# Patient Record
Sex: MALE | Race: WHITE | HISPANIC OR LATINO | URBAN - METROPOLITAN AREA
[De-identification: names, ages, dates, MRNs, and addresses within clinical notes are randomized per-mention and may not be internally consistent; named-entity substitution may affect disease eponyms.]

---
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
## 2020-03-13 ENCOUNTER — APPOINTMENT (OUTPATIENT)
Dept: NEUROLOGY | Facility: CLINIC | Age: 20
End: 2020-03-13
Payer: MEDICAID

## 2020-03-13 VITALS
HEIGHT: 71 IN | WEIGHT: 150 LBS | SYSTOLIC BLOOD PRESSURE: 126 MMHG | DIASTOLIC BLOOD PRESSURE: 80 MMHG | BODY MASS INDEX: 21 KG/M2

## 2020-03-13 DIAGNOSIS — G62.9 POLYNEUROPATHY, UNSPECIFIED: ICD-10-CM

## 2020-03-13 PROCEDURE — 99204 OFFICE O/P NEW MOD 45 MIN: CPT

## 2020-04-07 ENCOUNTER — APPOINTMENT (OUTPATIENT)
Dept: NEUROLOGY | Facility: CLINIC | Age: 20
End: 2020-04-07

## 2020-06-23 ENCOUNTER — EMERGENCY (EMERGENCY)
Facility: HOSPITAL | Age: 20
LOS: 1 days | Discharge: HOME | End: 2020-06-23
Attending: EMERGENCY MEDICINE
Payer: COMMERCIAL

## 2020-06-23 VITALS
WEIGHT: 160.06 LBS | DIASTOLIC BLOOD PRESSURE: 84 MMHG | TEMPERATURE: 99 F | RESPIRATION RATE: 18 BRPM | OXYGEN SATURATION: 98 % | HEART RATE: 88 BPM | SYSTOLIC BLOOD PRESSURE: 124 MMHG

## 2020-06-23 LAB
APPEARANCE UR: CLEAR — SIGNIFICANT CHANGE UP
BILIRUB UR-MCNC: NEGATIVE — SIGNIFICANT CHANGE UP
COLOR SPEC: YELLOW — SIGNIFICANT CHANGE UP
DIFF PNL FLD: NEGATIVE — SIGNIFICANT CHANGE UP
GLUCOSE UR QL: NEGATIVE MG/DL — SIGNIFICANT CHANGE UP
KETONES UR-MCNC: ABNORMAL
LEUKOCYTE ESTERASE UR-ACNC: NEGATIVE — SIGNIFICANT CHANGE UP
NITRITE UR-MCNC: NEGATIVE — SIGNIFICANT CHANGE UP
PH UR: 6 — SIGNIFICANT CHANGE UP (ref 5–8)
PROT UR-MCNC: 30 MG/DL
SP GR SPEC: 1.02 — SIGNIFICANT CHANGE UP (ref 1.01–1.02)
UROBILINOGEN FLD QL: NEGATIVE MG/DL — SIGNIFICANT CHANGE UP

## 2020-06-23 PROCEDURE — 87591 N.GONORRHOEAE DNA AMP PROB: CPT

## 2020-06-23 PROCEDURE — 81001 URINALYSIS AUTO W/SCOPE: CPT

## 2020-06-23 PROCEDURE — 99283 EMERGENCY DEPT VISIT LOW MDM: CPT

## 2020-06-23 PROCEDURE — 99284 EMERGENCY DEPT VISIT MOD MDM: CPT

## 2020-06-23 PROCEDURE — 87086 URINE CULTURE/COLONY COUNT: CPT

## 2020-06-23 PROCEDURE — 87491 CHLMYD TRACH DNA AMP PROBE: CPT

## 2020-06-23 RX ORDER — IBUPROFEN 200 MG
600 TABLET ORAL ONCE
Refills: 0 | Status: COMPLETED | OUTPATIENT
Start: 2020-06-23 | End: 2020-06-23

## 2020-06-23 RX ADMIN — Medication 600 MILLIGRAM(S): at 23:36

## 2020-06-23 NOTE — ED PROVIDER NOTE - OBJECTIVE STATEMENT
pt is a 20 y/o male presenting to the ed with "pain in his penis for the past six months". pt admits to burning sensation when he pees. pt denies ever being sexually active, trauma to the area, or any outside objects being put in the area. pt denies abd pain, nausea, vomiting, cp, sob, fevers, penile discharge, hematuria, back pain. pt with no hx of stds

## 2020-06-23 NOTE — ED ADULT TRIAGE NOTE - CHIEF COMPLAINT QUOTE
Patient is alert and oriented x3 c/o burning with pain inside his penis x6 months. also c/o dysuria. denies hematuria. denies fevers.

## 2020-06-23 NOTE — ED PROVIDER NOTE - PATIENT PORTAL LINK FT
You can access the FollowMyHealth Patient Portal offered by [facility] by registering at the following website: [email or web address]. By joining [employer]’s FollowMyHealth portal, you will also be able to view your health information using other applications (apps) compatible with our system.

## 2020-06-23 NOTE — ED PROVIDER NOTE - PHYSICAL EXAMINATION
Const: Awake, alert and oriented. In no acute distress. Well appearing.  HEENT: NC/AT. Moist mucous membranes.  Eyes: No scleral icterus. EOMI.  Neck:. Soft and supple. Full ROM without pain.  Cardiac: +S1/S2. No murmurs. Peripheral pulses 2+ and symmetric. No LE edema.  Resp: Speaking in full sentences. No evidence of respiratory distress. No wheezes, rales or rhonchi.  Abd: Soft, non-tender, non-distended. Normal bowel sounds in all 4 quadrants. No guarding or rebound.  Back: Spine midline and non-tender. No CVAT.  Genitlia: Uncircumcised, no external lesions noted or discharge, no testicular tenderness on palpation  Skin: No rashes, abrasions or lacerations.  Lymph: No cervical lymphadenopathy.  Neuro: Awake, alert & oriented x 3. Moves all extremities symmetrically.

## 2020-06-23 NOTE — ED PROVIDER NOTE - ATTENDING CONTRIBUTION TO CARE
HPI: 18yo male p/w penile pain x 6 months. Masturbates without lubrication and states he has pain on ejaculation. Denies ever being sexually active. Endorses slower stream of urine. No penile discharge, testicular pain, fevers, chills, or hematuria. Denies FB to urethra.     PE:  Gen: NAD  Head: NCAT  HEENT: Oral mucosa moist, normal conjunctiva  CV: RRR no murmurs, normal perfusion  Resp: CTA b/l, no wheezing, rales, rhonchi, no respiratory distress  GI: Abd Soft NTND  Neuro: No focal neuro deficits  MSK: FROM all 4 extremities, no deformity  Skin: No rash, no bruising  Psych: Normal affect  : Uncircumcised male, normal external genitalia, testicles nontender    MDM: Pt with penile pain x 6 months- check UA, GC/Chlamydia, likely urology f/u. Heather Young DO     I performed a history and physical exam of the patient and discussed their management with the advanced care provider. I reviewed the advanced care provider's note and agree with the documented findings and plan of care. My medical decision making and objective findings are found above.

## 2020-06-23 NOTE — ED PROVIDER NOTE - CARE PROVIDER_API CALL
Adolfo Short  UROLOGY  [address] [place] [id number]  Phone: [phone]  Fax: [phone]  Follow Up Time:

## 2020-06-24 LAB
EPI CELLS # UR: SIGNIFICANT CHANGE UP
RBC CASTS # UR COMP ASSIST: SIGNIFICANT CHANGE UP /HPF (ref 0–4)
WBC UR QL: NEGATIVE — SIGNIFICANT CHANGE UP

## 2020-06-24 NOTE — ED ADULT NURSE NOTE - OBJECTIVE STATEMENT
Patient is a 19 year old male, A&Ox3 in no apparent distress. c/o groin pain. Patient states has been experiencing burning sensation during urination and masturbation. Denies any sexual activity. States top of penis is red. Denies any discharge.

## 2020-06-25 LAB
C TRACH RRNA SPEC QL NAA+PROBE: SIGNIFICANT CHANGE UP
CULTURE RESULTS: NO GROWTH — SIGNIFICANT CHANGE UP
N GONORRHOEA RRNA SPEC QL NAA+PROBE: SIGNIFICANT CHANGE UP
SPECIMEN SOURCE: SIGNIFICANT CHANGE UP
SPECIMEN SOURCE: SIGNIFICANT CHANGE UP

## 2021-02-01 ENCOUNTER — EMERGENCY (EMERGENCY)
Facility: HOSPITAL | Age: 21
LOS: 1 days | Discharge: HOME | End: 2021-02-01
Attending: EMERGENCY MEDICINE
Payer: COMMERCIAL

## 2021-02-01 VITALS
HEIGHT: 71 IN | WEIGHT: 149.91 LBS | OXYGEN SATURATION: 95 % | DIASTOLIC BLOOD PRESSURE: 90 MMHG | RESPIRATION RATE: 17 BRPM | HEART RATE: 89 BPM | TEMPERATURE: 99 F | SYSTOLIC BLOOD PRESSURE: 129 MMHG

## 2021-02-01 PROCEDURE — 99283 EMERGENCY DEPT VISIT LOW MDM: CPT

## 2021-02-01 RX ORDER — IBUPROFEN 200 MG
600 TABLET ORAL ONCE
Refills: 0 | Status: COMPLETED | OUTPATIENT
Start: 2021-02-01 | End: 2021-02-01

## 2021-02-01 RX ADMIN — Medication 600 MILLIGRAM(S): at 21:19

## 2021-02-01 NOTE — ED PROVIDER NOTE - CLINICAL SUMMARY MEDICAL DECISION MAKING FREE TEXT BOX
Pt is a 20y M with no PMH presenting for R lateral neck pain lasting for a few minutes PTA and then resolved. No other complaints. Will medicate and reassess. Pt is a 20y M with no PMH presenting for R lateral neck pain lasting for a few minutes PTA and then resolved. No other complaints. Will medicate and dc with PMD f/u.

## 2021-02-01 NOTE — ED PROVIDER NOTE - PATIENT PORTAL LINK FT
You can access the FollowMyHealth Patient Portal offered by [facility] by registering at the following website: [email or web address]. By joining [employer]’s FollowMyHealth portal, you will also be able to view your health information using other applications (apps) compatible with our system.

## 2021-02-01 NOTE — ED PROVIDER NOTE - OBJECTIVE STATEMENT
Pt is a 20y M with no PMH presenting for R sided neck pain x few minutes PTA. Pt states symptoms have since resolved. He describes the feeling like he was 'punched in the throat'. He did not take anything for pain. Pt denies any fever/chills/sore throat/decreased ROM/cough. NKDA. No other complaints. Pt is a 20y M with no PMH presenting for R sided neck pain x few minutes PTA. Pt states symptoms have since resolved. He describes the feeling like he was 'punched in the throat'. He denies any trauma. He did not take anything for pain. Pt denies any fever/chills/sore throat/decreased ROM/cough. NKDA. No other complaints. No IVDA.

## 2021-02-01 NOTE — ED PROVIDER NOTE - ATTENDING CONTRIBUTION TO CARE
I, Teto Brown, performed a face to face bedside interview with this patient regarding history of present illness, review of symptoms and relevant past medical, social and family history.  I completed an independent physical examination. I have communicated the patient’s plan of care and disposition with the ACP.  20 year old male with no PMH presents with neck pain. States he had a sudden onset pain to his R anterior neck, felt like he got punched, and then it resolved. Denies throat pain, difficulty swallowing, fever, chills, blurry vision, numbness, tingling, weka ness  Gen: NAD, well appearing  ENT" oropharynx clear, no stridor, no carotid bruits, non-tender to palpation  CV: RRR  Pul: CTA b/l  Abd: Soft, non-distended, non-tender  Neuro: no focal deficits  Pt improved, stable for dc

## 2021-02-01 NOTE — ED ADULT TRIAGE NOTE - CHIEF COMPLAINT QUOTE
c/o pain to R side of neck beginning PTA, denies injury or trauma. full ROM noted, denies fevers or sick contacts

## 2021-06-19 ENCOUNTER — EMERGENCY (EMERGENCY)
Facility: HOSPITAL | Age: 21
LOS: 1 days | Discharge: HOME | End: 2021-06-19
Attending: EMERGENCY MEDICINE
Payer: COMMERCIAL

## 2021-06-19 VITALS
RESPIRATION RATE: 18 BRPM | TEMPERATURE: 98 F | SYSTOLIC BLOOD PRESSURE: 114 MMHG | DIASTOLIC BLOOD PRESSURE: 66 MMHG | OXYGEN SATURATION: 99 % | HEART RATE: 64 BPM | HEIGHT: 71 IN

## 2021-06-19 PROCEDURE — 99282 EMERGENCY DEPT VISIT SF MDM: CPT

## 2021-06-19 NOTE — ED STATDOCS - OBJECTIVE STATEMENT
20 M presents to ed c/o wound to R 2nd digit s/p injury earlier today after hitting it into metal. States it bled but stopped with pressure. Denies any other complaints or injuries. Tetanus UTD.

## 2021-06-19 NOTE — ED STATDOCS - ATTENDING CONTRIBUTION TO CARE
Pt. awake and alert. No open wound to 2nd digit. NO active bleeding. I, Dr. Panda, performed a face to face bedside interview with this patient regarding history of present illness, review of symptoms and relevant past medical, social and family history.  I completed an independent physical examination.  I have also reviewed the ACP's note(s) and discussed the plan with the ACP.

## 2021-06-19 NOTE — ED STATDOCS - PATIENT PORTAL LINK FT
You can access the FollowMyHealth Patient Portal offered by [facility] by registering at the following website: [email or web address]. By joining [employer]’s FollowMyHealth portal, you will also be able to view your health information using other applications (apps) compatible with our system.

## 2021-06-21 ENCOUNTER — EMERGENCY (EMERGENCY)
Facility: HOSPITAL | Age: 21
LOS: 1 days | Discharge: HOME | End: 2021-06-21
Payer: COMMERCIAL

## 2021-06-21 VITALS
OXYGEN SATURATION: 97 % | RESPIRATION RATE: 16 BRPM | TEMPERATURE: 98 F | HEIGHT: 71 IN | WEIGHT: 160.06 LBS | HEART RATE: 78 BPM | SYSTOLIC BLOOD PRESSURE: 117 MMHG | DIASTOLIC BLOOD PRESSURE: 66 MMHG

## 2021-06-21 LAB — SARS-COV-2 RNA SPEC QL NAA+PROBE: SIGNIFICANT CHANGE UP

## 2021-06-21 PROCEDURE — U0005: CPT

## 2021-06-21 PROCEDURE — 99283 EMERGENCY DEPT VISIT LOW MDM: CPT

## 2021-06-21 PROCEDURE — U0003: CPT

## 2021-06-21 NOTE — ED STATDOCS - PATIENT PORTAL LINK FT
You can access the FollowMyHealth Patient Portal offered by [facility] by registering at the following website: [email or web address]. By joining [employer]’s FollowMyHealth portal, you will also be able to view your health information using other applications (apps) compatible with our system.

## 2021-06-21 NOTE — ED STATDOCS - NS ED ROS FT
General: no fever or chills  Eyes: no redness or discharge  ENT: no nasal congestion, no sore throat  Cardiac: no CP, no palpitations  Respiratory: No cough, SOB, MCCORD, wheeze  Abd: no abd pain, N/V/D  Skin: no itch or rash

## 2021-06-22 ENCOUNTER — TRANSCRIPTION ENCOUNTER (OUTPATIENT)
Age: 21
End: 2021-06-22

## 2021-10-24 ENCOUNTER — EMERGENCY (EMERGENCY)
Facility: HOSPITAL | Age: 21
LOS: 1 days | Discharge: HOME | End: 2021-10-24
Attending: EMERGENCY MEDICINE
Payer: COMMERCIAL

## 2021-10-24 VITALS
TEMPERATURE: 98 F | DIASTOLIC BLOOD PRESSURE: 79 MMHG | HEIGHT: 71 IN | OXYGEN SATURATION: 98 % | RESPIRATION RATE: 18 BRPM | SYSTOLIC BLOOD PRESSURE: 137 MMHG | WEIGHT: 164.91 LBS | HEART RATE: 95 BPM

## 2021-10-24 PROCEDURE — 99282 EMERGENCY DEPT VISIT SF MDM: CPT

## 2021-10-24 NOTE — ED PROVIDER NOTE - NSFOLLOWUPINSTRUCTIONS_ED_ALL_ED_FT
Nail Bed Injury    The nail bed is the soft tissue under a fingernail or toenail. The nail bed can be injured in different ways. You may:  •Get bruising or bleeding under the nail.      •Get cuts in the nail or nail bed.      •Lose all or part of the nail.      After your nail is hurt or torn off, it can take many months to grow again. The nail may not grow back normally.      What are the causes?  This condition is usually caused by crushing, pinching, cutting, or tearing injuries of the fingertip or toe. These injuries might happen when a finger or toe gets:   •Caught in a door.      •Hit by a hammer.      •Damaged in accidents while you are using power tools or machinery.        What are the signs or symptoms?  Symptoms vary depending on the type of injury. Symptoms may include:  •Pain in the injured area.       •Bleeding.       •Swelling.       •A change in color of the area.      •Collection of blood under the nail (hematoma).    •Damage to the nail, such as:   •A deformed or split nail.      •A loose nail that is not stuck to the nail bed.       •Loss of all or part of the nail.          How is this treated?  Treatment may depend on the type of injury. Some injuries may not need any treatment other than keeping the area clean and free of infection. Treatment may include:  •Draining blood from under the nail. This can be done by making a small hole in the nail.      •Removing all or part of your nail. This might be done in order to stitch (suture) any cut in the nail bed.      •Stitching a torn-off nail back in place.      •Putting bandages (dressings) or splints on the area.    •Taking medicines, such as:  •Antibiotic medicine to help prevent infection.      •Pain medicine.        •Having a tetanus shot. This may be needed if you have not had a tetanus shot in the last 10 years.        Follow these instructions at home:    Managing pain, stiffness, and swelling     •Raise (elevate) the injured area above the level of your heart while you are sitting or lying down.      •Keep your injury protected with bandages or splints as told by your doctor.    •For an injured toenail:  •Try not to walk on your injured leg.      •Wear an open-toed shoe when you walk.      •Try not to let your leg hang down (dangle) when you are sitting or lying down.        Wound care   Follow any wound care instructions given by your doctor. Make sure you:  •Keep the injured area clean.      •Keep any bandages clean and dry.      •Wash your hands with soap and water for at least 20 seconds before and after you change any bandage. If you cannot use soap and water, use hand .      •Change or take off the bandage only as told by your doctor.      •Leave any stitches in place. These may need to stay in place for 2 weeks.    •Check the injured area every day for signs of infection. Check for:  •More redness, swelling, or pain.      •More fluid or blood.      •Warmth.      •Pus or a bad smell.        General instructions    •Take over-the-counter and prescription medicines only as told by your doctor.      •If you were prescribed an antibiotic medicine, use it as told by your doctor. Do not stop using the antibiotic even if you start to feel better.      •Ask your doctor if the medicine prescribed to you requires you to avoid driving or using machinery.      •Keep all follow-up visits as told by your doctor. This is important.        Contact a doctor if:    •Medicine does not help your pain.    •You have any of these problems in the injured area:  •More redness.      •More pain or swelling.      •More fluid or blood coming from the area.      •The area feels warm to the touch.      •Pus or a bad smell coming from the area.        •You have a fever and your symptoms get worse.        Get help right away if:    •You lose feeling (have numbness) in your finger or toe.      •Your finger or toe turns blue.        Summary    •The nail bed is the soft tissue under a fingernail or toenail.      •Sometimes, after a nail or nail bed is injured, the nail may not grow back normally.      •Raise (elevate) the injured area above the level of your heart while you are sitting or lying down.      •Keep any bandages clean and dry. Change or take them off only as told by your doctor.      •Take over-the-counter and prescription medicines only as told by your doctor.      This information is not intended to replace advice given to you by your health care provider. Make sure you discuss any questions you have with your health care provider.

## 2021-10-24 NOTE — ED PROVIDER NOTE - NS ED ROS FT
ROS: CONTUSIONAL: Denies fever, chills, fatigue, wt loss. HEAD: Denies trauma, HA, Dizziness. EYE: Denies Acute visual changes, diplopia. ENMT: Denies change in hearing, tinnitus, epistaxis, difficulty swallowing, sore throat. CARDIO: Denies CP, palpitations, edema. RESP: Denies Cough, SOB , Diff breathing, hemoptysis. GI: Denies N/V, ABD pain, change in bowel movement. URINARY: Denies difficulty urinating, pelvic pain. MS:  Denies joint pain, back pain, weakness, decreased ROM, swelling. NEURO: Denies change in gait, seizures, loss of sensation, dizziness, confusion LOC.  PSY: NO SI/HI. SKIN: +toe nail pain, Denies Rash, bruising.

## 2021-10-24 NOTE — ED PROVIDER NOTE - PATIENT PORTAL LINK FT
You can access the FollowMyHealth Patient Portal offered by [facility] by registering at the following website: [email or web address]. By joining [employer]’s FollowMyHealth portal, you will also be able to view your health information using other applications (apps) compatible with our system.

## 2021-10-24 NOTE — ED PROVIDER NOTE - SKIN LOCATION #1
Lt great toe JESSICA, sternght intact, no change in skin, good cap refill, horizontal partial fracture of nail

## 2021-10-24 NOTE — ED PROVIDER NOTE - CARE PROVIDER_API CALL
Ba Maria (DPM)  Podiatric Medicine and Surgery  [address] [place] [address]  Phone: [phone]  Fax: [phone]  Follow Up Time:

## 2021-10-24 NOTE — ED PROVIDER NOTE - CLINICAL SUMMARY MEDICAL DECISION MAKING FREE TEXT BOX
PT with stable VS, no acute distress, non toxic appearing, tolerating PO in the ED, Pt neuro vasc intact, no S/S of local or systemic inflection, Pt with partial chronic fracture of nail with no acute change or trama, Pt to be dc home with supportive care, follow up to podiatry, educated about when to return to the ED if needed. PT verbalizes that he understands all instructions and results. Pt informed that ED is open and available 24/7 365 days a yr, encouraged to return to the ED if they have any change in condition, or feel the need for revaluation.

## 2021-10-24 NOTE — ED PROVIDER NOTE - ADDITIONAL NOTES AND INSTRUCTIONS:
PT was evaluated At [facility] ED and was found to have a condition that warranted time of to rest and heal from WORK/SCHOOL.   Chava Zaldivar PA-C

## 2021-10-24 NOTE — ED PROVIDER NOTE - ATTENDING CONTRIBUTION TO CARE
Kelvin: I performed a face to face bedside interview with patient regarding history of present illness, review of symptoms and past medical history. I completed an independent physical exam.  I have discussed patient's plan of care with advanced care provider.   I agree with note as stated above including HISTORY OF PRESENT ILLNESS, HIV, PAST MEDICAL/SURGICAL/FAMILY/SOCIAL HISTORY, ALLERGIES AND HOME MEDICATIONS, REVIEW OF SYSTEMS, PHYSICAL EXAM, MEDICAL DECISION MAKING and any PROGRESS NOTES during the time I functioned as the attending physician for this patient  unless otherwise noted. My brief assessment is as follows: Patient with big toe pain, no signs of infection, appears that old toenail is fractured and will fall off, nailbed intact, new toenail growing in. Will give podiatry follow up. Return precautions given.

## 2021-10-24 NOTE — ED PROVIDER NOTE - OBJECTIVE STATEMENT
PT with no SPMHx presents to the ED with complaint of Lt great toe pain x 1 wk. Pt states that he had a gradual onset of pain that is non radiating, feels dull in nature, constat, made worse with activity. Pt states that he was seen a few months ago by podiatry and had a infection drained from that toe had resolution of symptoms then this new complaint started. Pt dines fever, chills, weakness, numbness, tingling, drainage, change in skin, redness, swelling, diff moving toe, trama.

## 2022-01-04 ENCOUNTER — APPOINTMENT (OUTPATIENT)
Dept: NEUROLOGY | Facility: CLINIC | Age: 22
End: 2022-01-04
Payer: MEDICAID

## 2022-01-04 DIAGNOSIS — Z86.69 PERSONAL HISTORY OF OTHER DISEASES OF THE NERVOUS SYSTEM AND SENSE ORGANS: ICD-10-CM

## 2022-01-04 DIAGNOSIS — G43.909 MIGRAINE, UNSPECIFIED, NOT INTRACTABLE, W/OUT STATUS MIGRAINOSUS: ICD-10-CM

## 2022-01-04 PROCEDURE — 99214 OFFICE O/P EST MOD 30 MIN: CPT | Mod: 95

## 2022-01-04 NOTE — HISTORY OF PRESENT ILLNESS
[FreeTextEntry1] : Mr. Reynoso is a 21 year-old man with PMH migraines who presents via Telehealth for evaluation of tinnitus x 6 months. He describes tinnitus as high pitched, constant and occurs in both ears. He has been evaluated by ENT and told he has no hearing loss. He reports that onset of symptoms may have occurred around the same time as exposure to 99% isopropyl alcohol he was using for work. He denies exposure to loud noises or ear infection. He reports chronic migraine headaches. He denies loss of vision, double vision, changes in personality or cognition, difficulty speaking or finding the correct words, unilateral weakness or impaired sensation, problems with coordination, difficulty walking or falls.

## 2022-01-04 NOTE — REVIEW OF SYSTEMS
[Feeling Tired] : feeling tired [Migraine Headache] : migraine headaches [Fever] : no fever [Chills] : no chills [Anxiety] : no anxiety [Depression] : no depression [Confused or Disoriented] : no confusion [Memory Lapses or Loss] : no memory loss [Decr. Concentrating Ability] : no decrease in concentrating ability [Difficulty with Language] : no ~M difficulty with language [Facial Weakness] : no facial weakness [Arm Weakness] : no arm weakness [Hand Weakness] : no hand weakness [Leg Weakness] : no leg weakness [Poor Coordination] : good coordination [Numbness] : no numbness [Tingling] : no tingling [Abnormal Sensation] : no abnormal sensation [Seizures] : no convulsions [Dizziness] : no dizziness [Difficulty Walking] : no difficulty walking [Inability to Walk] : able to walk [Ataxia] : no ataxia [Eye Pain] : no eye pain [Eyesight Problems] : no eyesight problems [Earache] : no earache [Loss Of Hearing] : no hearing loss [Chest Pain] : no chest pain [Palpitations] : no palpitations [Shortness Of Breath] : no shortness of breath [Cough] : no cough [SOB on Exertion] : no shortness of breath during exertion [Constipation] : no constipation [Diarrhea] : no diarrhea [Dysuria] : no dysuria [Incontinence] : no incontinence [Joint Pain] : no joint pain [Joint Swelling] : no joint swelling [Joint Stiffness] : no joint stiffness [FreeTextEntry4] : Tinnitus

## 2022-01-04 NOTE — DISCUSSION/SUMMARY
[FreeTextEntry1] : Mr. Reynoso is a 21 year-old man with PMH migraines who presents via Telehealth for evaluation of tinnitus x 6 months. Perform MRI and MRA head to rule-out intracranial or vascular cause of tinnitus and labs to rule-out metabolic cause of symptoms. If neurological work-up is negative, will refer to ENT for further evaluation. Follow-up with me in 6-8 weeks. All of his questions or concerns were addressed.

## 2022-01-04 NOTE — PHYSICAL EXAM
[FreeTextEntry1] : GENERAL PHYSICAL EXAM:\par GEN: no distress, normal affect\par HEENT: NCAT\par CV/PULM/ABD: deferred due to telehealth encounter \par \par NEUROLOGICAL EXAM:\par Alert and oriented to person, place, time, and situation \par Clear and fluent language, intact comprehension and repetition\par Grossly looks in all directs\par No obvious facial asymmetry \par Hearing intact \par BL shoulder shrug intact \par Moves all EXTs spontaneously\par Ambulates independently

## 2022-01-16 ENCOUNTER — EMERGENCY (EMERGENCY)
Facility: HOSPITAL | Age: 22
LOS: 1 days | Discharge: HOME | End: 2022-01-16
Attending: EMERGENCY MEDICINE
Payer: COMMERCIAL

## 2022-01-16 VITALS
DIASTOLIC BLOOD PRESSURE: 83 MMHG | OXYGEN SATURATION: 99 % | HEIGHT: 71 IN | RESPIRATION RATE: 20 BRPM | WEIGHT: 169.98 LBS | HEART RATE: 141 BPM | TEMPERATURE: 99 F | SYSTOLIC BLOOD PRESSURE: 124 MMHG

## 2022-01-16 VITALS — HEART RATE: 106 BPM

## 2022-01-16 LAB
ANION GAP SERPL CALC-SCNC: 15 MMOL/L — SIGNIFICANT CHANGE UP (ref 5–17)
APTT BLD: 39.9 SEC — HIGH (ref 27.5–35.5)
BASOPHILS # BLD AUTO: 0.02 K/UL — SIGNIFICANT CHANGE UP (ref 0–0.2)
BASOPHILS NFR BLD AUTO: 0.2 % — SIGNIFICANT CHANGE UP (ref 0–2)
BUN SERPL-MCNC: 8 MG/DL — SIGNIFICANT CHANGE UP (ref 8–20)
CALCIUM SERPL-MCNC: 9.1 MG/DL — SIGNIFICANT CHANGE UP (ref 8.6–10.2)
CHLORIDE SERPL-SCNC: 100 MMOL/L — SIGNIFICANT CHANGE UP (ref 98–107)
CO2 SERPL-SCNC: 23 MMOL/L — SIGNIFICANT CHANGE UP (ref 22–29)
CREAT SERPL-MCNC: 0.76 MG/DL — SIGNIFICANT CHANGE UP (ref 0.5–1.3)
D DIMER BLD IA.RAPID-MCNC: <150 NG/ML DDU — SIGNIFICANT CHANGE UP
EOSINOPHIL # BLD AUTO: 0.05 K/UL — SIGNIFICANT CHANGE UP (ref 0–0.5)
EOSINOPHIL NFR BLD AUTO: 0.5 % — SIGNIFICANT CHANGE UP (ref 0–6)
GLUCOSE SERPL-MCNC: 124 MG/DL — HIGH (ref 70–99)
HCT VFR BLD CALC: 40.2 % — SIGNIFICANT CHANGE UP (ref 39–50)
HGB BLD-MCNC: 14.4 G/DL — SIGNIFICANT CHANGE UP (ref 13–17)
IMM GRANULOCYTES NFR BLD AUTO: 0.2 % — SIGNIFICANT CHANGE UP (ref 0–1.5)
INR BLD: 1.34 RATIO — HIGH (ref 0.88–1.16)
LYMPHOCYTES # BLD AUTO: 2.88 K/UL — SIGNIFICANT CHANGE UP (ref 1–3.3)
LYMPHOCYTES # BLD AUTO: 27.5 % — SIGNIFICANT CHANGE UP (ref 13–44)
MCHC RBC-ENTMCNC: 30.1 PG — SIGNIFICANT CHANGE UP (ref 27–34)
MCHC RBC-ENTMCNC: 35.8 GM/DL — SIGNIFICANT CHANGE UP (ref 32–36)
MCV RBC AUTO: 84.1 FL — SIGNIFICANT CHANGE UP (ref 80–100)
MONOCYTES # BLD AUTO: 1.04 K/UL — HIGH (ref 0–0.9)
MONOCYTES NFR BLD AUTO: 9.9 % — SIGNIFICANT CHANGE UP (ref 2–14)
NEUTROPHILS # BLD AUTO: 6.46 K/UL — SIGNIFICANT CHANGE UP (ref 1.8–7.4)
NEUTROPHILS NFR BLD AUTO: 61.7 % — SIGNIFICANT CHANGE UP (ref 43–77)
PLATELET # BLD AUTO: 348 K/UL — SIGNIFICANT CHANGE UP (ref 150–400)
POTASSIUM SERPL-MCNC: 2.9 MMOL/L — CRITICAL LOW (ref 3.5–5.3)
POTASSIUM SERPL-SCNC: 2.9 MMOL/L — CRITICAL LOW (ref 3.5–5.3)
PROTHROM AB SERPL-ACNC: 15.3 SEC — HIGH (ref 10.6–13.6)
RBC # BLD: 4.78 M/UL — SIGNIFICANT CHANGE UP (ref 4.2–5.8)
RBC # FLD: 12 % — SIGNIFICANT CHANGE UP (ref 10.3–14.5)
SODIUM SERPL-SCNC: 138 MMOL/L — SIGNIFICANT CHANGE UP (ref 135–145)
TSH SERPL-MCNC: 2.24 UIU/ML — SIGNIFICANT CHANGE UP (ref 0.27–4.2)
WBC # BLD: 10.47 K/UL — SIGNIFICANT CHANGE UP (ref 3.8–10.5)
WBC # FLD AUTO: 10.47 K/UL — SIGNIFICANT CHANGE UP (ref 3.8–10.5)

## 2022-01-16 PROCEDURE — 85025 COMPLETE CBC W/AUTO DIFF WBC: CPT

## 2022-01-16 PROCEDURE — 85610 PROTHROMBIN TIME: CPT

## 2022-01-16 PROCEDURE — 71045 X-RAY EXAM CHEST 1 VIEW: CPT

## 2022-01-16 PROCEDURE — 99285 EMERGENCY DEPT VISIT HI MDM: CPT

## 2022-01-16 PROCEDURE — 93005 ELECTROCARDIOGRAM TRACING: CPT

## 2022-01-16 PROCEDURE — 36415 COLL VENOUS BLD VENIPUNCTURE: CPT

## 2022-01-16 PROCEDURE — 85379 FIBRIN DEGRADATION QUANT: CPT

## 2022-01-16 PROCEDURE — 99284 EMERGENCY DEPT VISIT MOD MDM: CPT | Mod: 25

## 2022-01-16 PROCEDURE — 80048 BASIC METABOLIC PNL TOTAL CA: CPT

## 2022-01-16 PROCEDURE — 85730 THROMBOPLASTIN TIME PARTIAL: CPT

## 2022-01-16 PROCEDURE — 93010 ELECTROCARDIOGRAM REPORT: CPT

## 2022-01-16 PROCEDURE — 96374 THER/PROPH/DIAG INJ IV PUSH: CPT

## 2022-01-16 PROCEDURE — 84443 ASSAY THYROID STIM HORMONE: CPT

## 2022-01-16 PROCEDURE — 71045 X-RAY EXAM CHEST 1 VIEW: CPT | Mod: 26

## 2022-01-16 RX ORDER — POTASSIUM CHLORIDE 20 MEQ
40 PACKET (EA) ORAL ONCE
Refills: 0 | Status: COMPLETED | OUTPATIENT
Start: 2022-01-16 | End: 2022-01-16

## 2022-01-16 RX ORDER — SODIUM CHLORIDE 9 MG/ML
1000 INJECTION INTRAMUSCULAR; INTRAVENOUS; SUBCUTANEOUS ONCE
Refills: 0 | Status: COMPLETED | OUTPATIENT
Start: 2022-01-16 | End: 2022-01-16

## 2022-01-16 RX ORDER — KETOROLAC TROMETHAMINE 30 MG/ML
15 SYRINGE (ML) INJECTION ONCE
Refills: 0 | Status: DISCONTINUED | OUTPATIENT
Start: 2022-01-16 | End: 2022-01-16

## 2022-01-16 RX ADMIN — Medication 40 MILLIEQUIVALENT(S): at 03:02

## 2022-01-16 RX ADMIN — SODIUM CHLORIDE 1000 MILLILITER(S): 9 INJECTION INTRAMUSCULAR; INTRAVENOUS; SUBCUTANEOUS at 02:31

## 2022-01-16 RX ADMIN — Medication 15 MILLIGRAM(S): at 02:31

## 2022-01-16 NOTE — ED PROVIDER NOTE - ATTENDING CONTRIBUTION TO CARE
Kelvin: I performed a face to face bedside interview with patient regarding history of present illness, review of symptoms and past medical history. I completed an independent physical exam and ordered tests/medications as needed.  I have discussed patient's plan of care with the resident. The resident assisted in  executing the discussed plan. I was available for any questions or issues that may have arose during the execution of the plan of care.

## 2022-01-16 NOTE — ED PROVIDER NOTE - PATIENT PORTAL LINK FT
You can access the FollowMyHealth Patient Portal offered by  by registering at the following website: [email or web address]. By joining [employer]’s FollowMyHealth portal, you will also be able to view your health information using other applications (apps) compatible with our system.

## 2022-01-16 NOTE — ED PROVIDER NOTE - OBJECTIVE STATEMENT
22 y/o male denies PMHx c/o palpitations. Pt states his heart rate was up to 155 BPM a few hours ago, per pt pulse ox which he has due to recent covid infection 2 weeks ago, pt states he check . Pt states he starting have fast reading earlier tonight. Pt states he had arm tingling before he checked his heart rate. Pt states he also has back pain, worse when lying on his back, better when laying on his side.  Pt denies previous hx of anxiety and depression. Pt denies known drug allergies. Pt denies chest pain, leg pain, leg swelling recent travel, SOB. 20 y/o male denies PMHx c/o palpitations. Pt states his heart rate was up to 155 BPM a few hours ago, per pt pulse ox which he has due to recent covid infection 2 weeks ago, pt states he checked his pulse ox at home and then began feeling palpitations when he noticed the number was high. Pt states he starting have fast reading earlier tonight. Pt states he had arm tingling before he checked his heart rate. Pt states he also has back pain, worse when lying on his back or with stretching, better when laying on his side.  Pt denies previous hx of anxiety and depression. Pt denies known drug allergies. Pt denies chest pain, leg pain, leg swelling, fever, recent travel, SOB.

## 2022-01-16 NOTE — ED PROVIDER NOTE - PHYSICAL EXAMINATION
Gen: Well appearing in NAD  Head: NC/AT  Neck: trachea midline  Card: tachycardic, regular rhythm   Resp:  CTAB  Abd: soft, non-tender  Ext: no calf tenderness, no lower extremity edema   Neuro:  A&O appears non focal  Skin:  Warm and dry as visualized  Psych:  Normal affect and mood

## 2022-01-16 NOTE — ED PROVIDER NOTE - CLINICAL SUMMARY MEDICAL DECISION MAKING FREE TEXT BOX
22 yo male recent covid infection presents w palpitations. Will check labs, D-Dimer, IVF. Monitor and reassess.

## 2022-01-16 NOTE — ED ADULT NURSE NOTE - CHIEF COMPLAINT
The patient is a 21y Male complaining of palpitations- recently tested positive two weeks ago. On assessment, pt is a/ox4- appears comfortable. HR goes up to 155 when pt starts to cry or become anxious. After deep breathing pt HR goes down. Bloods in progress, placed on monitor. Pending dispo.

## 2022-01-21 ENCOUNTER — EMERGENCY (EMERGENCY)
Facility: HOSPITAL | Age: 22
LOS: 1 days | Discharge: HOME | End: 2022-01-21
Attending: EMERGENCY MEDICINE
Payer: COMMERCIAL

## 2022-01-21 VITALS
SYSTOLIC BLOOD PRESSURE: 122 MMHG | OXYGEN SATURATION: 100 % | DIASTOLIC BLOOD PRESSURE: 65 MMHG | HEART RATE: 110 BPM | RESPIRATION RATE: 20 BRPM | TEMPERATURE: 98 F | HEIGHT: 71 IN

## 2022-01-21 VITALS — RESPIRATION RATE: 18 BRPM | HEART RATE: 77 BPM | OXYGEN SATURATION: 100 %

## 2022-01-21 LAB
ANION GAP SERPL CALC-SCNC: 13 MMOL/L — SIGNIFICANT CHANGE UP (ref 5–17)
BUN SERPL-MCNC: 8.4 MG/DL — SIGNIFICANT CHANGE UP (ref 8–20)
CALCIUM SERPL-MCNC: 9.6 MG/DL — SIGNIFICANT CHANGE UP (ref 8.6–10.2)
CHLORIDE SERPL-SCNC: 101 MMOL/L — SIGNIFICANT CHANGE UP (ref 98–107)
CO2 SERPL-SCNC: 26 MMOL/L — SIGNIFICANT CHANGE UP (ref 22–29)
CREAT SERPL-MCNC: 0.66 MG/DL — SIGNIFICANT CHANGE UP (ref 0.5–1.3)
GLUCOSE SERPL-MCNC: 96 MG/DL — SIGNIFICANT CHANGE UP (ref 70–99)
HCT VFR BLD CALC: 43 % — SIGNIFICANT CHANGE UP (ref 39–50)
HGB BLD-MCNC: 15.2 G/DL — SIGNIFICANT CHANGE UP (ref 13–17)
MAGNESIUM SERPL-MCNC: 2 MG/DL — SIGNIFICANT CHANGE UP (ref 1.6–2.6)
MCHC RBC-ENTMCNC: 29.6 PG — SIGNIFICANT CHANGE UP (ref 27–34)
MCHC RBC-ENTMCNC: 35.3 GM/DL — SIGNIFICANT CHANGE UP (ref 32–36)
MCV RBC AUTO: 83.8 FL — SIGNIFICANT CHANGE UP (ref 80–100)
PLATELET # BLD AUTO: 321 K/UL — SIGNIFICANT CHANGE UP (ref 150–400)
POTASSIUM SERPL-MCNC: 4.1 MMOL/L — SIGNIFICANT CHANGE UP (ref 3.5–5.3)
POTASSIUM SERPL-SCNC: 4.1 MMOL/L — SIGNIFICANT CHANGE UP (ref 3.5–5.3)
RBC # BLD: 5.13 M/UL — SIGNIFICANT CHANGE UP (ref 4.2–5.8)
RBC # FLD: 12.2 % — SIGNIFICANT CHANGE UP (ref 10.3–14.5)
SODIUM SERPL-SCNC: 140 MMOL/L — SIGNIFICANT CHANGE UP (ref 135–145)
WBC # BLD: 13.92 K/UL — HIGH (ref 3.8–10.5)
WBC # FLD AUTO: 13.92 K/UL — HIGH (ref 3.8–10.5)

## 2022-01-21 PROCEDURE — 93005 ELECTROCARDIOGRAM TRACING: CPT

## 2022-01-21 PROCEDURE — 83735 ASSAY OF MAGNESIUM: CPT

## 2022-01-21 PROCEDURE — 80048 BASIC METABOLIC PNL TOTAL CA: CPT

## 2022-01-21 PROCEDURE — 99284 EMERGENCY DEPT VISIT MOD MDM: CPT

## 2022-01-21 PROCEDURE — 93010 ELECTROCARDIOGRAM REPORT: CPT

## 2022-01-21 PROCEDURE — 99283 EMERGENCY DEPT VISIT LOW MDM: CPT

## 2022-01-21 PROCEDURE — 85027 COMPLETE CBC AUTOMATED: CPT

## 2022-01-21 PROCEDURE — 36415 COLL VENOUS BLD VENIPUNCTURE: CPT

## 2022-01-21 RX ORDER — SODIUM CHLORIDE 9 MG/ML
1000 INJECTION INTRAMUSCULAR; INTRAVENOUS; SUBCUTANEOUS ONCE
Refills: 0 | Status: COMPLETED | OUTPATIENT
Start: 2022-01-21 | End: 2022-01-21

## 2022-01-21 RX ADMIN — SODIUM CHLORIDE 1000 MILLILITER(S): 9 INJECTION INTRAMUSCULAR; INTRAVENOUS; SUBCUTANEOUS at 04:34

## 2022-01-21 NOTE — ED PROVIDER NOTE - PATIENT PORTAL LINK FT
Alert and oriented to person, place and time, memory intact, behavior appropriate to situation, PERRL.
You can access the FollowMyHealth Patient Portal offered by [facility] by registering at the following website: [email or web address]. By joining [facility]’s FollowMyHealth portal, you will also be able to view your health information using other applications (apps) compatible with our system.

## 2022-01-21 NOTE — ED PROVIDER NOTE - NSFOLLOWUPINSTRUCTIONS_ED_ALL_ED_FT
- Follow up with your doctor within 2-3 days.   - Follow with your cardiologist.   - Bring results with you to the appointment.   - Return to the ED for any new or worsening symptoms.     Palpitations    A palpitation is the feeling that your heartbeat is irregular or is faster than normal. It may feel like your heart is fluttering or skipping a beat. They may be caused by many things, including smoking, caffeine, alcohol, stress, and certain medicines. Although most causes of palpitations are not serious, palpitations can be a sign of a serious medical problem. Avoid caffeine, alcohol, and tobacco products at home. Try to reduce stress and anxiety and make sure to get plenty of rest.     SEEK IMMEDIATE MEDICAL CARE IF YOU HAVE ANY OF THE FOLLOWING SYMPTOMS: chest pain, shortness of breath, severe headache, dizziness/lightheadedness, or fainting.

## 2022-01-21 NOTE — ED PROVIDER NOTE - OBJECTIVE STATEMENT
21M no PMH p/w palpitations tonight, worse when he stands up. Has been seen for same 5 days ago, followed up with cardiology, had a holter and is waiting for results. Has been checking his HR multiple times per day on home pulse ox. Denies SOB, fever, vomiting, anxiety.

## 2022-01-21 NOTE — ED ADULT TRIAGE NOTE - CHIEF COMPLAINT QUOTE
Pt. BIBA for palpitations. Pt. states he was here the other day and was told he had low potassium. anxious in triage. Pt. denies other hx.

## 2022-01-21 NOTE — ED PROVIDER NOTE - CLINICAL SUMMARY MEDICAL DECISION MAKING FREE TEXT BOX
Patient with recurrent palpitations, VSS, benign exam. Following with cardiology. Was hypokalemic on last visit. Plan for ecg, labs, reassess.

## 2022-01-21 NOTE — ED ADULT NURSE NOTE - OBJECTIVE STATEMENT
Assumed care of the Pt AOx3 in no acute distress. pt states he feeling palpitations tried taking some omi seltzer but it did not work Pt was seen in ED recently for similar complaints Pt appears anxious current HR is 87 meds to be given as per orders, labs to be sent as per orders pt educated on plan of care, pt able to successfully teach back plan of care to RN, RN will continue to reeducate pt during hospital stay.

## 2022-01-21 NOTE — ED PROVIDER NOTE - PHYSICAL EXAMINATION
Gen: Well appearing in NAD  Head: NC/AT  Neck: trachea midline  Resp:  No distress, CTAB  CV: RRR  GI: soft, NTND  Ext: no deformities, no calf ttp  Neuro:  A&O appears non focal  Skin:  Warm and dry as visualized  Psych:  Normal affect and mood

## 2022-01-23 ENCOUNTER — APPOINTMENT (OUTPATIENT)
Dept: MRI IMAGING | Facility: CLINIC | Age: 22
End: 2022-01-23
Payer: MEDICAID

## 2022-01-23 ENCOUNTER — OUTPATIENT (OUTPATIENT)
Dept: OUTPATIENT SERVICES | Facility: HOSPITAL | Age: 22
LOS: 1 days | End: 2022-01-23
Payer: MEDICAID

## 2022-01-23 DIAGNOSIS — G43.909 MIGRAINE, UNSPECIFIED, NOT INTRACTABLE, WITHOUT STATUS MIGRAINOSUS: ICD-10-CM

## 2022-01-23 DIAGNOSIS — H93.19 TINNITUS, UNSPECIFIED EAR: ICD-10-CM

## 2022-01-23 PROCEDURE — 70553 MRI BRAIN STEM W/O & W/DYE: CPT | Mod: 26

## 2022-01-23 PROCEDURE — 70544 MR ANGIOGRAPHY HEAD W/O DYE: CPT | Mod: 26,59

## 2022-01-23 PROCEDURE — A9585: CPT

## 2022-01-23 PROCEDURE — 70544 MR ANGIOGRAPHY HEAD W/O DYE: CPT

## 2022-01-23 PROCEDURE — 70553 MRI BRAIN STEM W/O & W/DYE: CPT

## 2022-02-28 ENCOUNTER — EMERGENCY (EMERGENCY)
Facility: HOSPITAL | Age: 22
LOS: 1 days | Discharge: HOME | End: 2022-02-28
Attending: EMERGENCY MEDICINE
Payer: COMMERCIAL

## 2022-02-28 VITALS
RESPIRATION RATE: 20 BRPM | SYSTOLIC BLOOD PRESSURE: 138 MMHG | DIASTOLIC BLOOD PRESSURE: 91 MMHG | OXYGEN SATURATION: 98 % | TEMPERATURE: 99 F | HEART RATE: 126 BPM | WEIGHT: 169.98 LBS | HEIGHT: 71 IN

## 2022-02-28 PROCEDURE — 99284 EMERGENCY DEPT VISIT MOD MDM: CPT

## 2022-02-28 PROCEDURE — 93010 ELECTROCARDIOGRAM REPORT: CPT

## 2022-02-28 PROCEDURE — 93005 ELECTROCARDIOGRAM TRACING: CPT

## 2022-02-28 PROCEDURE — 99283 EMERGENCY DEPT VISIT LOW MDM: CPT

## 2022-02-28 NOTE — ED PROVIDER NOTE - PATIENT PORTAL LINK FT
You can access the FollowMyHealth Patient Portal offered by [facility] by registering at the following website: [email or web address]. By joining [email or web address]’s FollowMyHealth portal, you will also be able to view your health information using other applications (apps) compatible with our system.

## 2022-02-28 NOTE — ED PROVIDER NOTE - OBJECTIVE STATEMENT
20 y/o male with PMHx of tachycardia presents to the ED c/o chest pain and palpitations. Pt states he was unable to sleep due to the palpitations and did not take anything for his symptoms PTA. Pt follows with cardiology outpatient and has been evaluated for similar complaints in the past.     denies fever. denies HA or neck pain. no sob. no abd pain. no n/v/d. no urinary f/u/d. no back pain. no motor or sensory deficits. denies illicit drug use. no recent travel. no rash. no other acute issues symptoms or concerns

## 2022-02-28 NOTE — ED ADULT TRIAGE NOTE - CHIEF COMPLAINT QUOTE
patient states that he has chest pain radiating to his back with palpitations sweaty fingers not sleeping

## 2022-03-10 ENCOUNTER — APPOINTMENT (OUTPATIENT)
Dept: NEUROLOGY | Facility: CLINIC | Age: 22
End: 2022-03-10
Payer: MEDICAID

## 2022-03-10 VITALS
SYSTOLIC BLOOD PRESSURE: 142 MMHG | OXYGEN SATURATION: 98 % | WEIGHT: 170 LBS | HEART RATE: 115 BPM | HEIGHT: 71 IN | BODY MASS INDEX: 23.8 KG/M2 | DIASTOLIC BLOOD PRESSURE: 88 MMHG | TEMPERATURE: 98 F

## 2022-03-10 PROCEDURE — 99214 OFFICE O/P EST MOD 30 MIN: CPT

## 2022-03-11 NOTE — PHYSICAL EXAM
[FreeTextEntry1] : GENERAL PHYSICAL EXAM:\par GEN: no distress, normal affect\par HEENT: NCAT, OP clear\par EYES: sclera white, conjunctiva clear, no nystagmus\par NECK: supple\par CV: normal rhythm\par PULM: no respiratory distress, normal rhythm and effort\par EXT: no edema, no cyanosis\par MSK: muscle tone and strength normal\par SKIN: warm, dry, no rash or lesion on exposed skin \par \par NEUROLOGICAL EXAM:\par Mental Status\par Orientation: alert and oriented to person, place, time, and situation, 3/3 recall after 5 minutes\par Language: clear and fluent, intact comprehension and repetition, intact naming and reading\par \par Cranial Nerves\par II: visual fields full to confrontation \par III, IV, VI: PERRL, EOMI\par V, VII: facial sensation and movement intact and symmetric \par VIII: hearing intact \par IX, X: uvula midline, soft palate elevates normally \par XI: BL shoulder shrug intact \par XII: tongue midline\par \par Motor\par Shoulder abd: 5 (R), 5 (L)\par EF/EE: 5 (R), 5 (L)\par WF/WE: 5 (R), 5 (L)\par hand : 5 (R), 5 (L)\par HF/HE: 5 (R), 5 (L)\par KF/KE: 5 (R), 5 (L)\par DF/PF: 5 (R), 5 (L) \par Tone and bulk are normal in upper and lower limbs\par No pronator drift\par \par Sensation\par Intact to light touch and vibration in all 4 EXTs\par \par Reflex\par 2+ in BL biceps, brachioradialis, patella\par \par Coordination\par Normal FTN bilaterally\par Dysdiadochokinesia not present. \par Able to perform rapid, alternating movements\par \par Gait\par Normal stance, stride, and pivot turn\par Tandem walk intact\par Negative Romberg

## 2022-03-11 NOTE — HISTORY OF PRESENT ILLNESS
[FreeTextEntry1] : INTERIM HISTORY: Since his last visit, Mr. Reynoso had MRI and MRA performed, which were unremarkable. He tested positive for COVID shortly after our last appointment. He was evaluated by cardiology for elevated HR. Stress test and TTE were normal. He has been very anxious and has noted clammy hands and burning/tingling in bilateral hands and feet. \par \par INITIAL OFFICE VISIT 1/4/22: Mr. Reynoso is a 21 year-old man with PMH migraines who presents via Telehealth for evaluation of tinnitus x 6 months. He describes tinnitus as high pitched, constant and occurs in both ears. He has been evaluated by ENT and told he has no hearing loss. He reports that onset of symptoms may have occurred around the same time as exposure to 99% isopropyl alcohol he was using for work. He denies exposure to loud noises or ear infection. He reports chronic migraine headaches. He denies loss of vision, double vision, changes in personality or cognition, difficulty speaking or finding the correct words, unilateral weakness or impaired sensation, problems with coordination, difficulty walking or falls.

## 2022-03-11 NOTE — DISCUSSION/SUMMARY
[FreeTextEntry1] : Mr. Reynoso is a 21 year-old man with PMH migraines who presents for follow-up of tinnitus x 6 months. MRI and MRA head were unremarkable. Perform EMG to assess paresthesias in the hands and feet. Follow-up with me in 3 months or sooner should the need arise. All of his questions or concerns were addressed.

## 2022-04-04 ENCOUNTER — EMERGENCY (EMERGENCY)
Facility: HOSPITAL | Age: 22
LOS: 1 days | Discharge: HOME | End: 2022-04-04
Attending: EMERGENCY MEDICINE
Payer: COMMERCIAL

## 2022-04-04 VITALS
HEIGHT: 71 IN | OXYGEN SATURATION: 98 % | SYSTOLIC BLOOD PRESSURE: 133 MMHG | WEIGHT: 175.05 LBS | TEMPERATURE: 98 F | RESPIRATION RATE: 18 BRPM | DIASTOLIC BLOOD PRESSURE: 81 MMHG | HEART RATE: 87 BPM

## 2022-04-04 PROCEDURE — 99283 EMERGENCY DEPT VISIT LOW MDM: CPT

## 2022-04-04 RX ORDER — METHOCARBAMOL 500 MG/1
750 TABLET, FILM COATED ORAL ONCE
Refills: 0 | Status: COMPLETED | OUTPATIENT
Start: 2022-04-04 | End: 2022-04-04

## 2022-04-04 RX ORDER — IBUPROFEN 200 MG
600 TABLET ORAL ONCE
Refills: 0 | Status: COMPLETED | OUTPATIENT
Start: 2022-04-04 | End: 2022-04-04

## 2022-04-04 RX ADMIN — Medication 600 MILLIGRAM(S): at 23:37

## 2022-04-04 RX ADMIN — METHOCARBAMOL 750 MILLIGRAM(S): 500 TABLET, FILM COATED ORAL at 23:37

## 2022-04-04 NOTE — ED PROVIDER NOTE - PHYSICAL EXAMINATION
Gen: Well appearing in NAD  Head: NC/AT,   Neck: trachea midline, + minimal right paracervical tenderness, no midline spinal tenderness  Resp:  No distress  Ext: no deformities, distal pulses intact  Neuro:  CN 2-12 intact, No tremors. No fasciculations. No nystagmus. Normal finger to nose and heel to shin b/l. No dysmetria.  Normal gait. Normal sensation. Normal strength.   Back: no midline spinal tenderness  Skin:  Warm and dry as visualized  Psych:  Normal affect and mood Gen: Well appearing in NAD  Head: NC/AT,   Neck: trachea midline, + minimal right paracervical tenderness, no midline spinal tenderness  Resp:  No distress  Ext: no deformities, distal pulses intact, normal cap refill.   Neuro:  CN 2-12 intact, No tremors. No fasciculations. No nystagmus. No dysmetria.  Normal gait. Normal sensation. Normal strength. Normal radial pulses bilaterally.   Back: no midline spinal tenderness  Skin:  Warm and dry as visualized  Psych:  Normal affect and mood

## 2022-04-04 NOTE — ED PROVIDER NOTE - NSFOLLOWUPINSTRUCTIONS_ED_ALL_ED_FT
- Follow up with your doctor within 2-3 days.   - [facility] with [facility], see information above.   - Take Tylenol (Acetaminophen) 650mg or Motrin (Ibuprofen/Advil) 600mg every 6 hours as needed for pain.   - Return to the ED for any new or worsening symptoms.

## 2022-04-04 NOTE — ED PROVIDER NOTE - CLINICAL SUMMARY MEDICAL DECISION MAKING FREE TEXT BOX
pt with neck pain no red flags, normal exam, plan for pain control and outpatient follow-up pt with neck pain no red flags, normal exam, Normal strength/sensation, FROM. plan for pain control and outpatient follow-up

## 2022-04-04 NOTE — ED PROVIDER NOTE - OBJECTIVE STATEMENT
22 y/o male c/o back pain and  tingling to bilateral hands for the past week. Pt has not seen his doctor for this issues.  No fevers, chills, recent spinal procedures, bowel/bladder incontinence, IVDU, cancer, > 51 yo, recent weight loss, trauma, weakness, dysuria, hematuria 22 y/o male c/o back pain and tingling to bilateral hands for the past week. Pt has seen his doctor but did not tell him about the issues.   No fevers, chills, recent spinal procedures, bowel/bladder incontinence, IVDU, cancer, > 49 yo, recent weight loss, trauma, weakness, dysuria, hematuria.

## 2022-04-04 NOTE — ED PROVIDER NOTE - NSFOLLOWUPCLINICS_GEN_ALL_ED_FT
[facility] - [facility]  Neurosurgery/Spine  [address] [place] [id number]  Phone: [phone]  Fax:

## 2022-04-04 NOTE — ED ADULT TRIAGE NOTE - CHIEF COMPLAINT QUOTE
pt c/o back pain , + numbness and tingling on fingers, denies any injury , denies dizziness, denies N/V

## 2022-04-04 NOTE — ED ADULT TRIAGE NOTE - ACCOMPANIED BY
Patient's wet clothes put in a belongings bag, dry socks supplied and warm blanket offered - patient declined blankets, but requested water. Will check with MD about supplying water at this time. Police officers at bedside   Parent

## 2022-04-04 NOTE — ED PROVIDER NOTE - PATIENT PORTAL LINK FT
You can access the FollowMyHealth Patient Portal offered by [facility] by registering at the following website: [email or web address]. By joining [facility]’s FollowMyHealth portal, you will also be able to view your health information using other applications (apps) compatible with our system.

## 2022-04-22 ENCOUNTER — APPOINTMENT (OUTPATIENT)
Dept: NEUROLOGY | Facility: CLINIC | Age: 22
End: 2022-04-22
Payer: MEDICAID

## 2022-04-22 PROCEDURE — 95910 NRV CNDJ TEST 7-8 STUDIES: CPT

## 2022-04-22 PROCEDURE — 95886 MUSC TEST DONE W/N TEST COMP: CPT

## 2022-04-22 NOTE — PROCEDURE
[FreeTextEntry3] : EMG/NCS was performed. Please see scanned EMG results for further details.\par Pt advised to follow up with referring provider for further management.

## 2022-05-09 ENCOUNTER — APPOINTMENT (OUTPATIENT)
Dept: NEUROLOGY | Facility: CLINIC | Age: 22
End: 2022-05-09

## 2022-05-31 ENCOUNTER — APPOINTMENT (OUTPATIENT)
Dept: NEUROLOGY | Facility: CLINIC | Age: 22
End: 2022-05-31
Payer: MEDICAID

## 2022-05-31 VITALS
HEART RATE: 82 BPM | WEIGHT: 175 LBS | DIASTOLIC BLOOD PRESSURE: 76 MMHG | OXYGEN SATURATION: 98 % | HEIGHT: 71 IN | BODY MASS INDEX: 24.5 KG/M2 | SYSTOLIC BLOOD PRESSURE: 144 MMHG | TEMPERATURE: 97.9 F

## 2022-05-31 DIAGNOSIS — M54.2 CERVICALGIA: ICD-10-CM

## 2022-05-31 DIAGNOSIS — R20.2 PARESTHESIA OF SKIN: ICD-10-CM

## 2022-05-31 PROCEDURE — 99213 OFFICE O/P EST LOW 20 MIN: CPT

## 2022-05-31 NOTE — HISTORY OF PRESENT ILLNESS
[FreeTextEntry1] : INTERIM HISTORY: Since his last visit, Mr. Reynoso had an EMG performed, which was normal in the LUE and LLE. The burning and tingling sensation in his hands has resolved. He reports neck pain. He denies radicular pain pain the arm or hand weakness. \par \par INITIAL OFFICE VISIT 1/4/22: Mr. Reynoso is a 21 year-old man with PMH migraines who presents via Telehealth for evaluation of tinnitus x 6 months. He describes tinnitus as high pitched, constant and occurs in both ears. He has been evaluated by ENT and told he has no hearing loss. He reports that onset of symptoms may have occurred around the same time as exposure to 99% isopropyl alcohol he was using for work. He denies exposure to loud noises or ear infection. He reports chronic migraine headaches. He denies loss of vision, double vision, changes in personality or cognition, difficulty speaking or finding the correct words, unilateral weakness or impaired sensation, problems with coordination, difficulty walking or falls. \par \par 3/10/22: Since his last visit, Mr. Reynoso had MRI and MRA performed, which were unremarkable. He tested positive for COVID shortly after our last appointment. He was evaluated by cardiology for elevated HR. Stress test and TTE were normal. He has been very anxious and has noted clammy hands and burning/tingling in bilateral hands and feet.

## 2022-05-31 NOTE — REVIEW OF SYSTEMS
[Feeling Tired] : feeling tired [Migraine Headache] : migraine headaches [Fever] : no fever [Chills] : no chills [Anxiety] : no anxiety [Depression] : no depression [Confused or Disoriented] : no confusion [Memory Lapses or Loss] : no memory loss [Decr. Concentrating Ability] : no decrease in concentrating ability [Difficulty with Language] : no ~M difficulty with language [Facial Weakness] : no facial weakness [Arm Weakness] : no arm weakness [Hand Weakness] : no hand weakness [Leg Weakness] : no leg weakness [Poor Coordination] : good coordination [Numbness] : no numbness [Tingling] : no tingling [Abnormal Sensation] : no abnormal sensation [Seizures] : no convulsions [Dizziness] : no dizziness [Difficulty Walking] : no difficulty walking [Inability to Walk] : able to walk [Ataxia] : no ataxia [Eye Pain] : no eye pain [Eyesight Problems] : no eyesight problems [Earache] : no earache [Loss Of Hearing] : no hearing loss [Chest Pain] : no chest pain [Palpitations] : no palpitations [Shortness Of Breath] : no shortness of breath [Cough] : no cough [SOB on Exertion] : no shortness of breath during exertion [Constipation] : no constipation [Diarrhea] : no diarrhea [Dysuria] : no dysuria [Incontinence] : no incontinence [Joint Pain] : no joint pain [Joint Swelling] : no joint swelling [Joint Stiffness] : no joint stiffness [FreeTextEntry4] : Tinnitus  [FreeTextEntry9] : Neck pain, tightness

## 2022-05-31 NOTE — DISCUSSION/SUMMARY
Rehab Medicine         Patient: Shayla Rogers Date: 2020   : 1936 Attending: Matt Ma MD   84 year old female                    Subjective:  Patient does not appear in distress, patient able to answer my questions or speech for history has much improved.  Patient able to ambulate short distance with the staff.    Patient has agreed to continue inpatient rehab with the goal of returning home.  Patient lives with daughter, however daughter works for school system and patient not be able to remain home independently for at least 8 hours.    Patient has been able to agree for transfer to [facility], patient notes that she will be charged by ambulance company for this transport service    And a negative COVID-19 test is expected prior to transfer,  patient asymptomatic, last negative test on     Reviewed: Allergies, Medical History, Surgical History, Social History, Family History and Medications      Vital Last Value 24 Hour Range   Temperature 97.9 °F (36.6 °C) (20 075) Temp  Min: 97.2 °F (36.2 °C)  Max: 97.9 °F (36.6 °C)   Pulse 71 (20 075) Pulse  Min: 71  Max: 75   Respiratory 16 (20) Resp  Min: 16  Max: 18   Non-Invasive  Blood Pressure 138/76 (20) BP  Min: 138/76  Max: 182/74   Pulse Oximetry 99 % (20) SpO2  Min: 99 %  Max: 100 %     Vital Today Admit   Weight 85.7 kg (20 0500) Weight: 83.5 kg (20)   Height N/A Height: 4' 11\" (149.9 cm) (20)   BMI N/A BMI (Calculated): 37.16 (20)     Weight over the past 48 Hours:  Patient Vitals for the past 48 hrs:   Weight   20 0500 85 kg   20 0500 85.7 kg        Intake/Output:  Last Stool Occurrence:    I/O this shift:  In: -   Out: 300 [Urine:300]  I/O last 3 completed shifts:  In: 50 [P.O.:50]  Out: 1450 [Urine:1450]    Intake/Output Summary (Last 24 hours) at 2020 0901  Last data filed at 2020 0818  Gross per 24  [FreeTextEntry1] : Mr. Reynoso is a 21 year-old man with PMH migraines who presents for follow-up. MRI and MRA head were unremarkable. EMG was normal. Referral provided for physical therapy to address neck pain and posterior headaches.  Follow-up with me in 3 months or sooner should the need arise. All of his questions or concerns were addressed.  hour   Intake 50 ml   Output 1750 ml   Net -1700 ml       Hartmann: No    PHYSICAL EXAM:  Constiutional:  Speech fluent much improved, remains alert oriented, tolerates oral diet, remains continent with bowel and bladder management  Neck: trachea midline, supple.  Cardiovascular:  Irregular  Respiratory:  Maintains quite respirations, cough effort remains coordinated  Gastrointestinal: +BS, non-distended, soft, non-tender.  Genitourinary: no suprapubic or costovertebral angle tenderness.  Musculoskeletal:  Functional muscle strength, mildly reduced left side  Neurologic: Patient maintains eye contact well, vision and hearing functional, speech mildly dysarthric, however able to count from 1-5 and 5-1 fluently.  Patient demonstrates functional strength for both upper lower extremities, gait is somewhat slow, able to walk with a walker, no cornell loss of balance.  Muscle strength functional for both lower extremities  Psychiatric: affect and mood appropriate. The patient is alert, interactive, appropriate.    Laboratory Results:  Lab Results   Component Value Date    SODIUM 140 12/28/2020    POTASSIUM 4.0 12/28/2020    CHLORIDE 105 12/28/2020    CO2 27 12/28/2020    CALCIUM 8.7 12/28/2020    BUN 28 (H) 12/28/2020    CREATININE 1.04 (H) 12/28/2020    INR 1.1 12/26/2020    PT 11.2 12/26/2020    WBC 13.5 (H) 12/28/2020    HCT 26.5 (L) 12/28/2020    HGB 8.9 (L) 12/28/2020     12/28/2020    TSH 4.574 12/14/2020    ALBUMIN 3.3 (L) 12/26/2020    GFRA >60 03/05/2014    GFRNA 57 (L) 03/05/2014    GLUCOSE 115 (H) 12/28/2020           Current Functional status over the last 24 hours:    Nursing Skin Documentation:   Integumentary Assessment: Exceptions to WDL (12/29/20 0689)   Bladder FIM Documentation:                         Bowel FIM Documentation:                        Pain Documentation:       Mobility Documentation:   ,     ,  ,   Supine to sit: with verbal cues, minimal assist and with tactile cues  Training  completed:    Tasks: sit to supine and supine to sit    Education details: patient safety and patient demonstrates understanding    Cues for hand placement and sequencing.   Transfers:    Assistive devices: 2-wheeled walker and gait belt    Sit to stand: with verbal cues, minimal assist and with tactile cues    Stand to sit: with verbal cues, minimal assist and with tactile cues    Toilet: minimal assist and with verbal cues  Training completed:    Tasks: sit to stand and stand to sit    Education details: patient safety and patient requires additional training    Cues for hand placement, to improve eccentric control and safety for line management.   Gait/Ambulation:      Assistance: with verbal cues, with tactile cues and minimal assist   Assistive device: 2-wheeled walker and gait belt    Distance (ft): 100    Pattern: step to    Type: decreased anton  Training Completed:    Tasks: gait training on level surfaces    Education details: patient safety and patient requires additional training    Wheelchair follow due to reduced activity tolerance and patient complaining of R LE pain. Slight veering to R during ambulation and improved with cueing.          ,  ,     ,     Selfcare Documentation:          Activities of Daily Living (ADLs):  Lower Body Dressing:     Footwear assistance: maximal assist    Footwear position: edge of bed    Assist needed for: don/doff right sock, don/doff left sock, steadying, increased time to complete and verbal cueing     Vision:     Eye range of motion: Left: within normal limits (however slightly confused with direction following, only able to follow 1 step commands) Right: within normal limits    Head position: normal    Comments / Details: Completed random [place] cross out with with increase time. Pt jumping back and forth from right side to left. Able to cross all circles out.   Simple trail: able to complete however connecting number 4-3.                  Communication/Cognition/Swallowing Documentation:   ,   mildly increased confusion, more slow, pocketing with meal tray. Patient's bottom denture poorly situated, pocketing decreases with denture adhesive and moderate verbal cues. SLP assess with general consistency, requires cues for attention to task and for clearing pocketed material. Patient able to complete lingual sweeping to clear material given cues and time. Recommend Level 3/easy chew consistency, adhesed dentures, thin liquids.   Requires SLP Follow Up: Yes      ,     ,              Scheduled Meds:  • aspirin  81 mg Oral Daily   • polyethylene glycol  17 g Oral BID   • docusate sodium-sennosides  1 tablet Oral Nightly   • sodium chloride (PF)  2 mL Intracatheter 2 times per day   • heparin (porcine)  5,000 Units Subcutaneous 3 times per day   • atorvastatin  80 mg Oral Nightly   • Potassium Standard Replacement Protocol   Does not apply See Admin Instructions   • Magnesium Standard Replacement Protocol   Does not apply See Admin Instructions     Patient Vitals for the past 48 hrs:   BP Pulse   12/28/20 1617 (!) 181/72 64   12/28/20 2340 (!) 144/71 62   12/29/20 0858 (!) 143/62 70   12/29/20 1525 (!) 182/74 75   12/29/20 1529 (!) 140/60 --   12/29/20 2357 (!) 144/63 72   12/30/20 0757 138/76 71       Patient Active Problem List   Diagnosis   • Osteoarthrosis, unspecified whether generalized or localized, lower leg   • BMI 40.0-44.9, adult (CMS/HCC)   • Chronic renal failure in pediatric patient, stage 3 (moderate) (CMS/HCC)   • Iron deficiency anemia   • Multiple myeloma not having achieved remission (CMS/HCC)   • Facial droop   • Stroke (CMS/HCC)   • Impaired mobility and ADLs       Radiology:    Results for orders placed during the hospital encounter of 12/26/20   CT Head Level 1    Impression IMPRESSION:      No acute intracranial findings.    Probable sequela of chronic microvascular ischemic changes.      Findings were called immediately to the  patient's physician assistant,  BRIANNA LUI on 12/26/2020 6:07 PM.     MRI Brain   Results for orders placed in visit on 01/07/14   MRI BRAIN    Impression IMPRESSION:      1. Nonspecific periventricular and subcortical white matter changes  bilaterally which in light of the patient's age likely reflects chronic  small vessel ischemic changes. No evidence for acute ischemia.  2. No abnormal enhancements. No evidence for CP angle mass.       US Carotid Duplex Bilateral    Results for orders placed during the hospital encounter of 12/26/20   US CAROTID BILATERAL    Impression IMPRESSION:  1. This study is essentially normal. There is no sonographic evidence of  carotid artery stenosis bilaterally.     2. Vertebral artery flow is antegrade bilaterally.             US Chest AP or PA   Results for orders placed during the hospital encounter of 12/26/20   XR Chest AP or PA    Impression IMPRESSION:     No acute findings.      Results for orders placed in visit on 04/06/15   XR CHEST PA AND LATERAL    Impression IMPRESSION:  There is minor atelectasis at the left lung base. Please correlate  clinically to exclude the unlikelihood of an early infiltrate.                              Quality Indicators       DVT/VTE Prophylaxis:  VTE Pharmacologic Prophylaxis: Yes  VTE Mechanical Prophylaxis: Yes  MRI brain on December 27:  Acute/subacute infarction involving the left lentiform nucleus, internal  capsule and caudate nucleus.   Carotid ultrasound study on December 27:essentially normal. There is no sonographic evidence of  carotid artery stenosis bilaterally.      2. Vertebral artery flow is antegrade bilaterally.    Rehab Diagnosis:  Difficulty in mobility and self cares, resolving confusion and speech difficulty, acute stroke.  Multiple myeloma, medication adjustment in progress.  Anemia, chronic kidney disease.     Patient remains under care of multiple medical specialties, per hematology recommendations, patient will  require initially monotherapy with dexamethasone .  Neurology MD recommend patient to continue with aspirin and Lipitor, patient most likely will require 30 day cardiac event monitor.  Formal physical and occupational therapy exams completed, patient demonstrates good activity tolerance and participation, patient on my exam demonstrate mild functional deficits and will require rehab.     Patient has ambulatory and ADL dysfunction due to above mentioned diagnoses.  The patient's medical condition is such that the patient can actively participate in, appropriately benefit from, and tolerate an intensive rehabilitation program. Close medical supervision by a physiatrist is required to ensure that the patient's status continues to allow maximized progress and outcome from a medical and functional standpoint. Due to the complexity of the medical and rehabilitation needs, this patient will need an intensive interdisciplinary and coordinated rehabilitation program.The patient will benefit from an intensive therapy program consisting of nursing, PT, OT and speech therapy to allow for neuromuscular re-education, strengthening, range of motion, bed mobility training, functional transfer training, gait training, balance training, stairs training, ADL retraining, cognitive therapy, swallow therapy, safety education, endurance training, patient/family training, compensatory technique education, and equipment evaluation/education/training.        Estimated Length of Stay:  10-12 days     Expected intensity, frequency, and duration of services:              Physical therapy:  1 hours per day, 5-7 days per week for duration of IRP             Occupational therapy:  1 hours per day, 5-7 days per week for duration       of IRP             Speech therapy:  1 hours per day, 5-7 days per week for duration of IRP               Prognosis:     Medical: Fair                         Functional: Good      Individualized plan of care will be  developed at the first interdisciplinary team meeting.      Goals:  For patient to return to community with improved medical stabiIity and functional performance. with improved medical stability and functional performance. Patient to return at prior living status, at a  modified independent level with ambulation and ADL's, this can be achieved by OT (Occupational Therapy), PT (Physical Therapy), ST (Speech Therapy), RT (Recreational Therapy) and psychology. Post discharge therapy intervention via home health or OP will be determined during IRP interdisciplinary team meetings.     Anticipated post-discharge treatment: Follow-up therapy in home or in out-patient setting. Plan will be determined during the IRP interdisciplinary team meetings prior to discharge.              Chun Benoit MD  12/30/2020  9:01 AM

## 2023-04-25 NOTE — ED PROVIDER NOTE - DISCHARGE DATE
suicidal ideation with plan and means
suicidal behavior/unable to care for self
suicidal ideation with plan and means
suicidal ideation with plan and means
suicidal behavior
suicidal ideation with plan and means
24-Jun-2020
suicidal ideation with plan and means
suicidal behavior/unable to care for self
suicidal ideation with plan and means

## 2023-09-04 ENCOUNTER — NON-APPOINTMENT (OUTPATIENT)
Age: 23
End: 2023-09-04

## 2023-10-09 ENCOUNTER — OFFICE (OUTPATIENT)
Dept: URBAN - METROPOLITAN AREA CLINIC 115 | Facility: CLINIC | Age: 23
Setting detail: OPHTHALMOLOGY
End: 2023-10-09
Payer: COMMERCIAL

## 2023-10-09 DIAGNOSIS — H47.233: ICD-10-CM

## 2023-10-09 DIAGNOSIS — H04.123: ICD-10-CM

## 2023-10-09 DIAGNOSIS — H52.13: ICD-10-CM

## 2023-10-09 DIAGNOSIS — H43.393: ICD-10-CM

## 2023-10-09 PROCEDURE — 92133 CPTRZD OPH DX IMG PST SGM ON: CPT | Performed by: OPHTHALMOLOGY

## 2023-10-09 PROCEDURE — 92014 COMPRE OPH EXAM EST PT 1/>: CPT | Performed by: OPHTHALMOLOGY

## 2023-10-09 PROCEDURE — 92015 DETERMINE REFRACTIVE STATE: CPT | Performed by: OPHTHALMOLOGY

## 2023-10-09 ASSESSMENT — REFRACTION_AUTOREFRACTION
OD_AXIS: 002
OS_SPHERE: -4.25
OD_SPHERE: -4.75
OS_AXIS: 004
OD_CYLINDER: -1.75
OS_CYLINDER: -1.75

## 2023-10-09 ASSESSMENT — REFRACTION_CURRENTRX
OD_VPRISM_DIRECTION: SV
OD_OVR_VA: 20/
OS_OVR_VA: 20/
OD_CYLINDER: -0.50
OS_VPRISM_DIRECTION: SV
OD_AXIS: 099
OS_AXIS: 102
OD_SPHERE: -4.75
OD_AXIS: 179
OD_SPHERE: -5.00
OS_OVR_VA: 20/
OS_SPHERE: -4.50
OS_SPHERE: -4.50
OS_CYLINDER: -0.25
OD_OVR_VA: 20/
OS_VPRISM_DIRECTION: SV
OS_CYLINDER: -1.00
OD_CYLINDER: -0.25
OS_AXIS: 001
OD_VPRISM_DIRECTION: SV

## 2023-10-09 ASSESSMENT — TEAR BREAK UP TIME (TBUT)
OS_TBUT: 1+
OD_TBUT: 1+

## 2023-10-09 ASSESSMENT — SPHEQUIV_DERIVED
OD_SPHEQUIV: -5.625
OS_SPHEQUIV: -5.125
OS_SPHEQUIV: -5.125
OD_SPHEQUIV: -5.625

## 2023-10-09 ASSESSMENT — KERATOMETRY
OS_AXISANGLE_DEGREES: 089
METHOD_AUTO_MANUAL: AUTO
OS_K2POWER_DIOPTERS: 45.75
OS_K1POWER_DIOPTERS: 43.75
OD_AXISANGLE_DEGREES: 094
OD_K2POWER_DIOPTERS: 45.25
OD_K1POWER_DIOPTERS: 43.75

## 2023-10-09 ASSESSMENT — PACHYMETRY
OS_CT_UM: 580
OD_CT_CORRECTION: -4
OD_CT_UM: 600
OS_CT_CORRECTION: -3

## 2023-10-09 ASSESSMENT — TONOMETRY
OS_IOP_MMHG: 18
OD_IOP_MMHG: 20

## 2023-10-09 ASSESSMENT — REFRACTION_MANIFEST
OS_AXIS: 180
OS_CYLINDER: -1.25
OD_VA1: 20/20
OS_VA1: 20/20
OD_CYLINDER: -1.25
OD_SPHERE: -5.00
OS_SPHERE: -4.50
OD_AXIS: 180

## 2023-10-09 ASSESSMENT — VISUAL ACUITY
OS_BCVA: 20/20-2
OD_BCVA: 20/25-1

## 2023-10-09 ASSESSMENT — AXIALLENGTH_DERIVED
OS_AL: 25.2367
OD_AL: 25.569
OD_AL: 25.569
OS_AL: 25.2367

## 2023-11-09 ENCOUNTER — OFFICE (OUTPATIENT)
Dept: URBAN - METROPOLITAN AREA CLINIC 115 | Facility: CLINIC | Age: 23
Setting detail: OPHTHALMOLOGY
End: 2023-11-09
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
## 2023-11-09 PROCEDURE — NO SHOW FE NO SHOW FEE: Performed by: OPHTHALMOLOGY

## 2023-11-23 ENCOUNTER — EMERGENCY (EMERGENCY)
Facility: HOSPITAL | Age: 23
LOS: 1 days | Discharge: HOME | End: 2023-11-23
Attending: EMERGENCY MEDICINE
Payer: MEDICAID

## 2023-11-23 VITALS
TEMPERATURE: 98 F | HEIGHT: 71 IN | HEART RATE: 91 BPM | RESPIRATION RATE: 20 BRPM | OXYGEN SATURATION: 98 % | DIASTOLIC BLOOD PRESSURE: 69 MMHG | SYSTOLIC BLOOD PRESSURE: 127 MMHG | WEIGHT: 209.66 LBS

## 2023-11-23 PROCEDURE — 99283 EMERGENCY DEPT VISIT LOW MDM: CPT

## 2023-11-23 RX ORDER — OXYCODONE AND ACETAMINOPHEN 5; 325 MG/1; MG/1
1 TABLET ORAL
Qty: 6 | Refills: 0
Start: 2023-11-23 | End: 2023-11-24

## 2023-11-23 NOTE — ED PROVIDER NOTE - PHYSICAL EXAMINATION
Gen: No acute distress, non toxic  HEENT: Mucous membranes moist, pink conjunctivae, EOMI. PERRL. Airway patent, uvula midline. +rt and left top wisdom tooth growing in, no palpable or visualized abscesses. no evidence of infectious processes.   CV: RRR, nl s1/s2.  Resp: CTAB, normal rate and effort  GI: Abdomen soft, NT, ND. No rebound, no guarding  : No CVAT b/l  Neuro: A&O x 3, moving all 4 extremities  MSK: No spine or joint tenderness to palpation  Skin: No rashes. intact and perfused.

## 2023-11-23 NOTE — ED PROVIDER NOTE - ATTENDING APP SHARED VISIT CONTRIBUTION OF CARE
22 yo male with no pmhx presents with dental pain x2 mo. Pt states his wisdom teeth have been growing in on the top and was told he needs to get his wisdom teeth taken out however hasn't been able to get an early appt. States he has an appt with oral surgeon set up for Jan 12th. States he has been taking tylenol for the pain but denies taking anything today. States he can't take ibuprofen so often bc has chronic reflux. States he has been able to eat/drink nl. Denies f/c/n/v/cp/sob/palpitations/ cough/rash/headache/dizziness/abd.pain/d/c/dysuria/hematuria  went to [facility] ed but was not able to see a dentist at that time    Head: atraumatic, normacephalic  Face: atraumatic, no crepitus no orbital/maxillary/mandibular ttp  throat: uvula midline no exudates no gum swelling  eyes: perrla eomi  heart: rrr s1s2  lungs: ctab  abd: soft, nt nd +bs no rebound/guarding no cva ttp      --impacted molars will give pain meds - referal coordinator for sooner appt with dental no facial swelling

## 2023-11-23 NOTE — ED PROVIDER NOTE - OBJECTIVE STATEMENT
22 yo male with no pmhx presents with dental pain x2 mo. Pt states his wisdom teeth have been growing in on the top and was told he needs to get his wisdom teeth taken out however hasn't been able to get an early appt. States he has an appt with oral surgeon set up for Jan 12th. States he has been taking tylenol for the pain but denies taking anything today. States he can't take ibuprofen so often bc has chronic reflux. States he has been able to eat/drink nl. Denies fever, chills, body aches, dizziness, LOC, difficulties swallowing, cp, palpitations, sob, abd pain, n/v/c/d, paresthesias in the extremities.

## 2023-11-23 NOTE — ED PROVIDER NOTE - NS ED ATTENDING STATEMENT MOD
This was a shared visit with the SEGUNDO. I reviewed and verified the documentation and independently performed the documented:

## 2023-11-23 NOTE — ED PROVIDER NOTE - NSFOLLOWUPCLINICS_GEN_ALL_ED_FT
[address] [place] [address]  Phone: [phone]  Fax:     [facility]  Dental  [address] [place] [address]  Phone: [phone]  Fax:     [facility]  [place] [address]  Phone: [phone]  Fax:

## 2023-11-23 NOTE — ED PROVIDER NOTE - NSFOLLOWUPINSTRUCTIONS_ED_ALL_ED_FT
- Please follow-up with your primary care doctor in the next 1-2. days.  Please call tomorrow for an appointment.  If you cannot follow-up with your primary care doctor please return to the ED for any urgent issues.  - You were given a copy of the tests performed today.  Please bring the results with you and review them with your primary care doctor.  - If you have any worsening of symptoms or any other concerns please return to the ED immediately.  - Please continue taking your home medications as directed.   - Follow up with the dentist    Dental Pain    Dental pain (toothache) may be caused by many things including tooth decay (cavities or caries), abscess or infection, or trauma. If you were prescribed an antibiotic medicine, finish all of it even if you start to feel better. Rinsing your mouth with salt water or applying ice to the painful area of your face may help with the pain. Follow up with a dentist is important in ensuring good oral health and preventing the worsening of dental disease.    SEEK IMMEDIATE MEDICAL CARE IF YOU HAVE ANY OF THE FOLLOWING SYMPTOMS: unable to open your mouth, trouble breathing or swallowing, fever, or swelling of the face, neck, or jaw.

## 2023-11-23 NOTE — ED ADULT TRIAGE NOTE - CHIEF COMPLAINT QUOTE
Patient presents to ED with c/o b/l  upper "wisdom teeth" pain that started 2 months ago with worsening pain tonight.  Seen at [facility] dental last week and still has to follow up with dentist.  No meds PTA

## 2023-11-23 NOTE — ED PROVIDER NOTE - PATIENT PORTAL LINK FT
You can access the FollowMyHealth Patient Portal offered by [facility] by registering at the following website: [email or web address]. By joining [email or web address]’s FollowMyHealth portal, you will also be able to view your health information using other applications (apps) compatible with our system.

## 2023-11-23 NOTE — ED PROVIDER NOTE - CLINICAL SUMMARY MEDICAL DECISION MAKING FREE TEXT BOX
22 yo male with no pmhx presents with dental pain x2 mo. pt with top wisdom teeth growing in, no evidence of infectious processes. pt able to eat/drink nl. pain meds given. vss, hemodynamically stable. strict return precautions explained.

## 2023-12-01 ENCOUNTER — EMERGENCY (EMERGENCY)
Facility: HOSPITAL | Age: 23
LOS: 1 days | Discharge: HOME | End: 2023-12-01
Attending: EMERGENCY MEDICINE
Payer: COMMERCIAL

## 2023-12-01 ENCOUNTER — EMERGENCY (EMERGENCY)
Facility: HOSPITAL | Age: 23
LOS: 1 days | Discharge: HOME | End: 2023-12-01
Attending: STUDENT IN AN ORGANIZED HEALTH CARE EDUCATION/TRAINING PROGRAM
Payer: COMMERCIAL

## 2023-12-01 VITALS
HEART RATE: 136 BPM | HEIGHT: 71 IN | TEMPERATURE: 98 F | OXYGEN SATURATION: 97 % | RESPIRATION RATE: 18 BRPM | DIASTOLIC BLOOD PRESSURE: 81 MMHG | WEIGHT: 206.13 LBS | SYSTOLIC BLOOD PRESSURE: 127 MMHG

## 2023-12-01 VITALS
DIASTOLIC BLOOD PRESSURE: 78 MMHG | HEIGHT: 71 IN | RESPIRATION RATE: 18 BRPM | HEART RATE: 95 BPM | WEIGHT: 205.91 LBS | TEMPERATURE: 98 F | OXYGEN SATURATION: 98 % | SYSTOLIC BLOOD PRESSURE: 119 MMHG

## 2023-12-01 VITALS — HEART RATE: 83 BPM | OXYGEN SATURATION: 95 %

## 2023-12-01 PROCEDURE — 99283 EMERGENCY DEPT VISIT LOW MDM: CPT

## 2023-12-01 PROCEDURE — 99282 EMERGENCY DEPT VISIT SF MDM: CPT

## 2023-12-01 PROCEDURE — 99284 EMERGENCY DEPT VISIT MOD MDM: CPT

## 2023-12-01 RX ORDER — PANTOPRAZOLE SODIUM 20 MG/1
1 TABLET, DELAYED RELEASE ORAL
Qty: 14 | Refills: 0
Start: 2023-12-01 | End: 2024-01-05

## 2023-12-01 RX ORDER — SUCRALFATE 1 G
10 TABLET ORAL
Qty: 200 | Refills: 0
Start: 2023-12-01

## 2023-12-01 RX ORDER — PANTOPRAZOLE SODIUM 20 MG/1
1 TABLET, DELAYED RELEASE ORAL
Qty: 14 | Refills: 0
Start: 2023-12-01 | End: 2023-12-14

## 2023-12-01 RX ADMIN — Medication 30 MILLILITER(S): at 21:40

## 2023-12-01 NOTE — ED ADULT NURSE NOTE - OBJECTIVE STATEMENT
A&OX4, Presents to ed with complaints of toothache, reports recently getting wisdom teeth removed, says " my head also hurts". No other complaints at this time.

## 2023-12-01 NOTE — ED PROVIDER NOTE - CARE PROVIDER_API CALL
Dennise Campos  Gastroenterology  [address], [address]  [place] [address]  Phone: [phone]  Fax: [phone]  Follow Up Time: 1-3 Days

## 2023-12-01 NOTE — ED ADULT TRIAGE NOTE - CHIEF COMPLAINT QUOTE
Pt walks in for triage for abdominal pain that has been going on for 2 months and is getting worse in severity. Pt is having intermittent nausea with no vomiting, Last BM was yesterday.

## 2023-12-01 NOTE — ED ADULT TRIAGE NOTE - CHIEF COMPLAINT QUOTE
pt presents c/o s/p wisdom teeth removal, has since been c/o confusion, states can barely think & head pressure. pt taking amoxicillin, motrin & oxycodone for wisdom teeth. pt also c/o extreme pain on opposite side of mouth from where had teeth removed pt presents c/o s/p wisdom teeth removal, has since been c/o confusion, states can barely think & head pressure. pt is ambulatory, A&Ox4. pt taking amoxicillin, motrin & oxycodone for wisdom teeth. pt also c/o extreme pain on opposite side of mouth from where had teeth removed

## 2023-12-01 NOTE — ED PROVIDER NOTE - PATIENT PORTAL LINK FT
You can access the FollowMyHealth Patient Portal offered by [facility] by registering at the following website: [email or web address]. By joining [employer]’s FollowMyHealth portal, you will also be able to view your health information using other applications (apps) compatible with our system.

## 2023-12-01 NOTE — ED ADULT NURSE NOTE - NSFALLUNIVINTERV_ED_ALL_ED
Bed/Stretcher in lowest position, wheels locked, appropriate side rails in place/Call bell, personal items and telephone in reach/Instruct patient to call for assistance before getting out of bed/chair/stretcher/Non-slip footwear applied when patient is off stretcher/[place] to call system/Physically safe environment - no spills, clutter or unnecessary equipment/Purposeful proactive rounding/Room/bathroom lighting operational, light cord in reach

## 2023-12-01 NOTE — ED PROVIDER NOTE - OBJECTIVE STATEMENT
23M with dental pain x 3 days. Pt states had wisdom tooth surgery on the right side 3 days ago and has been taking augmentin, ibuprofen and oxycodone. Pt states when he has pain he also gets headaches. States the oxycodone has been helping. He does not have follow up scheduled yet to remove impacted tooth on the left. 23M with dental pain x 3 days. Pt states had wisdom tooth surgery on the right side 3 days ago and has been taking augmentin, ibuprofen and oxycodone. Pt states when he has pain he also gets headaches. States the oxycodone has been helping. He does not have follow up scheduled yet to remove impacted tooth on the left.  Denies active bleeding

## 2023-12-01 NOTE — ED PROVIDER NOTE - ATTENDING APP SHARED VISIT CONTRIBUTION OF CARE
23M s/p wisdom tooth surgery on the right, Presenting with pain on left side due to impacted  wisdom tooth.  Is on antibiotics and pain improved with oxycodone.  Encouraged to follow-up with dental to get another wisdom tooth removed.  Information given for multiple dental clinics.  Return precautions given.

## 2023-12-01 NOTE — ED PROVIDER NOTE - NS ED ROS FT
No fever/chills   No photophobia/eye pain/changes in visio,   No ear pain/sore throat/dysphagia   No chest pain/palpitations  No SOB/cough/wheeze/stridor   + abdominal pain, + Nausea. no vomiting/diarrhea  No dysuria/frequency/discharge   No neck/back pain,   No rash  No changes in neurological status/function.

## 2023-12-01 NOTE — ED PROVIDER NOTE - CLINICAL SUMMARY MEDICAL DECISION MAKING FREE TEXT BOX
Pt with gastritis/reflux.  Pt reassured. given dose of maalox in ER. Rx sent to pharmacy. instructed to f/up with gi. instructed to return for worsening pain, vomiting, fever, or any other concerns.

## 2023-12-01 NOTE — ED PROVIDER NOTE - PHYSICAL EXAMINATION
Gen: No acute distress, non toxic  HEENT: NCAT. Mucous membranes moist, uvula m/l, healing at side of dental extraction, no active bleeding, no ttp, no visible abscess, pink conjunctivae, EOMI  Neuro: A&O x 3, moving all 4 extremities.   MSK: No spine or joint tenderness to palpation  Skin: No rashes. intact and perfused.

## 2023-12-01 NOTE — ED PROVIDER NOTE - PHYSICAL EXAMINATION
Constitutional - well-developed.   Head - NCAT. Airway patent.   Eyes - PERRL.   Abd - soft, nt. no rebound. no guarding.   Neuro - A&Ox3. strength 5/5 x4. sensation intact x4. normal gait.   Skin - No rash. .  MSK - normal ROM.

## 2023-12-01 NOTE — ED PROVIDER NOTE - CLINICAL SUMMARY MEDICAL DECISION MAKING FREE TEXT BOX
23M s/p wisdom tooth surgery on the right. Pain improved with oxycodone. Pt given dental info to follow up with. all questions answered, given return precautions. 23M s/p wisdom tooth surgery on the right, Presenting with pain on left side due to impacted  wisdom tooth.  Is on antibiotics and pain improved with oxycodone.  Encouraged to follow-up with dental to get another wisdom tooth removed.  Information given for multiple dental clinics.  Return precautions given.

## 2023-12-01 NOTE — ED PROVIDER NOTE - OBJECTIVE STATEMENT
Pt is a 22 yo M co abd pain. Pt states that for the past several months he has had epigastric pain that will radiate up to his throat. Pt states that it has been worse for the past two days since starting motrin, amox and percocet for his dental issue. +nausea. no vomiting. no fever/chills.

## 2023-12-01 NOTE — ED ADULT NURSE NOTE - CHIEF COMPLAINT QUOTE
pt presents c/o s/p wisdom teeth removal, has since been c/o confusion, states can barely think & head pressure. pt is ambulatory, A&Ox4. pt taking amoxicillin, motrin & oxycodone for wisdom teeth. pt also c/o extreme pain on opposite side of mouth from where had teeth removed

## 2023-12-01 NOTE — ED PROVIDER NOTE - PATIENT PORTAL LINK FT
You can access the FollowMyHealth Patient Portal offered by [facility] by registering at the following website: [email or web address]. By joining [employer]’s FollowMyHealth portal, you will also be able to view your health information using other applications (apps) compatible with our system.

## 2023-12-02 ENCOUNTER — EMERGENCY (EMERGENCY)
Facility: HOSPITAL | Age: 23
LOS: 1 days | Discharge: HOME | End: 2023-12-02
Attending: EMERGENCY MEDICINE
Payer: COMMERCIAL

## 2023-12-02 VITALS
HEIGHT: 71 IN | TEMPERATURE: 98 F | HEART RATE: 86 BPM | DIASTOLIC BLOOD PRESSURE: 79 MMHG | SYSTOLIC BLOOD PRESSURE: 139 MMHG | WEIGHT: 208.56 LBS | OXYGEN SATURATION: 98 % | RESPIRATION RATE: 20 BRPM

## 2023-12-02 PROCEDURE — 99285 EMERGENCY DEPT VISIT HI MDM: CPT

## 2023-12-02 RX ORDER — FAMOTIDINE 10 MG/ML
20 INJECTION INTRAVENOUS ONCE
Refills: 0 | Status: COMPLETED | OUTPATIENT
Start: 2023-12-02 | End: 2023-12-02

## 2023-12-02 RX ORDER — ACETAMINOPHEN 500 MG
1000 TABLET ORAL ONCE
Refills: 0 | Status: COMPLETED | OUTPATIENT
Start: 2023-12-02 | End: 2023-12-02

## 2023-12-02 RX ORDER — SODIUM CHLORIDE 9 MG/ML
1000 INJECTION INTRAMUSCULAR; INTRAVENOUS; SUBCUTANEOUS ONCE
Refills: 0 | Status: COMPLETED | OUTPATIENT
Start: 2023-12-02 | End: 2023-12-02

## 2023-12-02 RX ORDER — ONDANSETRON 8 MG/1
4 TABLET, FILM COATED ORAL ONCE
Refills: 0 | Status: COMPLETED | OUTPATIENT
Start: 2023-12-02 | End: 2023-12-02

## 2023-12-02 RX ADMIN — ONDANSETRON 4 MILLIGRAM(S): 8 TABLET, FILM COATED ORAL at 23:18

## 2023-12-02 RX ADMIN — SODIUM CHLORIDE 1000 MILLILITER(S): 9 INJECTION INTRAMUSCULAR; INTRAVENOUS; SUBCUTANEOUS at 23:18

## 2023-12-02 RX ADMIN — FAMOTIDINE 20 MILLIGRAM(S): 10 INJECTION INTRAVENOUS at 23:17

## 2023-12-02 RX ADMIN — Medication 400 MILLIGRAM(S): at 23:17

## 2023-12-02 NOTE — ED PROVIDER NOTE - SHIFT CHANGE DETAILS
2/12/2022 Pt seen per PCP today and notes currently asymptomatic and review of culture growing >100k enterobacter.  Pt currently having menstrual period and unable to repeat Ucx currently and will repeat when period is over.   r/o appy  ua CT

## 2023-12-02 NOTE — ED PROVIDER NOTE - PATIENT PORTAL LINK FT
You can access the FollowMyHealth Patient Portal offered by [facility] by registering at the following website: [email or web address]. By joining [employer]’s FollowMyHealth portal, you will also be able to view your health information using other applications (apps) compatible with our system. You can access the FollowMyHealth Patient Portal offered by [facility] by registering at the following website: [email or web address]. By joining [employer]’s FollowMyHealth portal, you will also be able to view your health information using other applications (apps) compatible with our system. You can access the FollowMyHealth Patient Portal offered by [facility] by registering at the following website: [email or web address]. By joining [employer]’s FollowMyHealth portal, you will also be able to view your health information using other applications (apps) compatible with our system.

## 2023-12-02 NOTE — ED PROVIDER NOTE - CARE PROVIDER_API CALL
Jonathon Crocker  Gastroenterology  [address], [address]  [place] [address]  Phone: [phone]  Fax: [phone]  Follow Up Time: Urgent   Jonathon Crocker  Gastroenterology  [address], [address]  [place] [address]  Phone: [phone]  Fax: [phone]  Follow Up Time: Urgent   Jonathon Crocker  Gastroenterology  [address], [address]  [place] [address]  Phone: [phone]  Fax: [phone]  Follow Up Time: Urgent

## 2023-12-02 NOTE — ED ADULT NURSE NOTE - NSFALLUNIVINTERV_ED_ALL_ED
Bed/Stretcher in lowest position, wheels locked, appropriate side rails in place/Call bell, personal items and telephone in reach/Instruct patient to call for assistance before getting out of bed/chair/stretcher/Non-slip footwear applied when patient is off stretcher/[place] to call system/Physically safe environment - no spills, clutter or unnecessary equipment/Purposeful proactive rounding/Room/bathroom lighting operational, light cord in reach Bed/Stretcher in lowest position, wheels locked, appropriate side rails in place/Call bell, personal items and telephone in reach/Instruct patient to call for assistance before getting out of bed/chair/stretcher/Non-slip footwear applied when patient is off stretcher/[place] to call system/Physically safe environment - no spills, clutter or unnecessary equipment/Purposeful proactive rounding/Room/bathroom lighting operational, light cord in reach Bed/Stretcher in lowest position, wheels locked, appropriate side rails in place/Call bell, personal items and telephone in reach/Instruct patient to call for assistance before getting out of bed/chair/stretcher/Non-slip footwear applied when patient is off stretcher/[place] to call system/Physically safe environment - no spills, clutter or unnecessary equipment/Purposeful proactive rounding/Room/bathroom lighting operational, light cord in reach

## 2023-12-02 NOTE — ED PROVIDER NOTE - PROVIDER TOKENS
PROVIDER:[TOKEN:[[id number]],FOLLOWUP:[Urgent]] PROVIDER:[TOKEN:[[id number]],FOLLOWUP:[Urgent]] PROVIDER:[TOKEN:[[id number]],FOLLOWUP:[Urgent]]

## 2023-12-02 NOTE — ED PROVIDER NOTE - CLINICAL SUMMARY MEDICAL DECISION MAKING FREE TEXT BOX
"  Subjective     Prince Stephens, *     Diagnosis Plan   1. Ophthalmic Graves disease          Mr. Mota is a very pleasant 41-year-old [place] gentleman who lives now in [place] who has a history of ophthalmic Graves' disease that goes back to greater than 2 years.  He has been under the care of Mirian Brooks and Ryland Stephens at  the [facility] and underwent bilateral orbitotomy for decompression in February of last year, bilateral upper lid retraction repair in April of last year and bilateral lateral wall decompression in May of last year.He is now approximately 10 months post his most recent surgery and has continues to have significant difficulty with proptosis inflammation diplopia light sensitivity intermittent throbbing pain.  He describes intolerance to steroids, but does admit his symptoms did improve dramatically when he takes them.  We are asked to evaluate the patient for a course of radiation therapy in this setting.                                           Review of Systems   Constitutional: Negative.    Eyes: Positive for visual disturbance.   Respiratory: Negative.    Cardiovascular: Negative.    Neurological: Negative.          Past Medical History:   Diagnosis Date   • Exophthalmos    • Graves disease    • Headache    • High cholesterol    • Kidney stones          Past Surgical History:   Procedure Laterality Date   • ABDOMINAL SURGERY      REMOVE \"CHOLESTEROL\"   • ENTROPIAN REPAIR Bilateral 4/7/2016    Procedure: SHAMIKA UPPER LID RETRACTION REPAIR W/ SCLERA /W;  Surgeon: Yovany Brooks MD;  Location: [facility];  Service:    • EYE SURGERY      SHAMIKA 2/2016   • ORBITAL DECOMPRESSION Right 5/26/2016    Procedure: RT LATERAL WALL DECOMPRESSION;  Surgeon: Prince Stephens MD;  Location: [facility];  Service:    • ORBITAL DECOMPRESSION Left 5/31/2016    Procedure: LT LATERAL WALL DECOMPRESSION;  Surgeon: Prince Stephens MD;  Location: [facility];  " Service:          Social History     Social History   • Marital status:      Spouse name: N/A   • Number of children: N/A   • Years of education: N/A     Social History Main Topics   • Smoking status: Current Every Day Smoker     Packs/day: 2.00     Years: 25.00   • Smokeless tobacco: Never Used   • Alcohol use No   • Drug use: No   • Sexual activity: Defer     Other Topics Concern   • Not on file     Social History Narrative         No family history on file.       Objective    Physical Exam  Bilateral exophthalmos    Current Outpatient Prescriptions on File Prior to Visit   Medication Sig Dispense Refill   • erythromycin (ROMYCIN) 5 MG/GM ophthalmic ointment Apply to sutures twice a day 1 each 1   • erythromycin (ROMYCIN) ophthalmic ointment Apply to sutures twice a day 1 each 1   • simvastatin (ZOCOR) 40 MG tablet Take 40 mg by mouth every night.       No current facility-administered medications on file prior to visit.        ALLERGIES:    Allergies   Allergen Reactions   • Prednisone Rash     Pt states breaking out in rash and palpatations       There were no vitals taken for this visit.     No flowsheet data found.      Assessment/Plan   Ophthalmic Graves disease       Persistent, possibly progressive symptoms post 3 wall decompression and lid retraction repair, patient unable take steroids.  I went over the details of a short course of radiation therapy with the patient I dose aim would be 20 Gy in 10 fractions holding the lens dose to less than 10 Gy.        I spent greater than 45 minutes and face-to-face time with the patient and greater than 30 minutes of that time was spent in counseling and coordination of care including indications, goals, logistics, alternatives, risks, review of imaging, discussion of surveillance, and potential outcomes.             Kraig Parmar Jr., MD        Patient is a 22 yo male with no PMHx, recently completed amoxicillin and percocet for dental pain, recent visit to [facility] for abdominal pain presenting with lower abdominal pain and dysuria. VSS    Will check labs, control pain, r.o electrolyte abnormalities, r.o pancreatitis, UA to r.o UTI ,CT to r.o appendicitis vs. diverticulitis, reassess. Patient is a 24 yo male with no PMHx, recently completed amoxicillin and percocet for dental pain, recent visit to [facility] for abdominal pain presenting with lower abdominal pain and dysuria. VSS    Will check labs, control pain, r.o electrolyte abnormalities, r.o pancreatitis, UA to r.o UTI ,CT to r.o appendicitis vs. diverticulitis, reassess. Patient is a 24 yo male with no PMHx, recently completed amoxicillin and percocet for dental pain, recent visit to [facility] for abdominal pain presenting with lower abdominal pain and dysuria. VSS    Will check labs, control pain, r.o electrolyte abnormalities, r.o pancreatitis, UA to r.o UTI ,CT to r.o appendicitis vs. diverticulitis, reassess.

## 2023-12-02 NOTE — ED PROVIDER NOTE - OBJECTIVE STATEMENT
Patient is a 22 yo male with no PMHx, recently completed amoxicillin and percocet for dental pain, recent visit to [facility] for abdominal pain presenting with lower abdominal pain and dysuria. Patient states he has had lower abdominal pain and dysuria since this morning; patient was at [facility] yesterday for upper abdominal pain, and discharged with a diagnosis of gastritis. The pain came back once patient got home. Denies fevers, chills, dizziness, lightheadedness, dysphagia, dysarthria, diplopia, photophobia, syncope, cough, congestion, SOB, CP, neck pain, back pain, diarrhea, hematuria, hematochezia, hematemesis, n/v, recent travel, sick contacts, leg swelling. Patient is a 24 yo male with no PMHx, recently completed amoxicillin and percocet for dental pain, recent visit to [facility] for abdominal pain presenting with lower abdominal pain and dysuria. Patient states he has had lower abdominal pain and dysuria since this morning; patient was at [facility] yesterday for upper abdominal pain, and discharged with a diagnosis of gastritis. The pain came back once patient got home. Denies fevers, chills, dizziness, lightheadedness, dysphagia, dysarthria, diplopia, photophobia, syncope, cough, congestion, SOB, CP, neck pain, back pain, diarrhea, hematuria, hematochezia, hematemesis, n/v, recent travel, sick contacts, leg swelling. Patient is a 22 yo male with no PMHx, recently completed amoxicillin and percocet for dental pain, recent visit to [facility] for abdominal pain presenting with lower abdominal pain and dysuria. Patient states he has had lower abdominal pain and dysuria since this morning; patient was at [facility] yesterday for upper abdominal pain, and discharged with a diagnosis of gastritis. The pain came back once patient got home. Denies fevers, chills, dizziness, lightheadedness, dysphagia, dysarthria, diplopia, photophobia, syncope, cough, congestion, SOB, CP, neck pain, back pain, diarrhea, hematuria, hematochezia, hematemesis, n/v, recent travel, sick contacts, leg swelling.

## 2023-12-02 NOTE — ED PROVIDER NOTE - PHYSICAL EXAMINATION
Gen: no acute distress  Head: normocephalic, atraumatic  Lung: CTAB, no respiratory distress, no wheezing, rales, rhonchi  CV: normal s1/s2, rrr,   Abd: soft, ttp in the LLQ, no peritoneal signs   MSK: No edema, no visible deformities, full range of motion in all 4 extremities  Neuro: No focal neurologic deficits  Skin: No rash

## 2023-12-02 NOTE — ED PROVIDER NOTE - ATTENDING CONTRIBUTION TO CARE
third visit for abd pain  recent dental work on oxy AB  pe as documented  agree w labs and imaging, ua  agree w eval and mngt third visit for abd pain  recent dental work on oxy AB  pe as documented  +ttp LLQ RLQ must consider acute appendicitis  agree w labs and imaging, ua  agree w eval and mngt

## 2023-12-02 NOTE — ED PROVIDER NOTE - CARE PROVIDERS DIRECT ADDRESSES
,[email or web address]

## 2023-12-02 NOTE — ED PROVIDER NOTE - PROGRESS NOTE DETAILS
JK - labs CT WNL. VSS resting comfortably. Tolerating PO. Well appearing. Ready for DC with return precautions, close GI f.u. Currently stable.

## 2023-12-02 NOTE — ED ADULT NURSE NOTE - OBJECTIVE STATEMENT
Pt is here with c/o abd pain for the past 3 days.  States he was seen here and told her has gastritis and the pain isn't going away.  IV inserted, pt medicated as ordered.  IVF infusing without s/s redness or swelling noted.

## 2023-12-03 LAB
ALBUMIN SERPL ELPH-MCNC: 4.9 G/DL — SIGNIFICANT CHANGE UP (ref 3.3–5.2)
ALBUMIN SERPL ELPH-MCNC: 4.9 G/DL — SIGNIFICANT CHANGE UP (ref 3.3–5.2)
ALP SERPL-CCNC: 111 U/L — SIGNIFICANT CHANGE UP (ref 40–120)
ALP SERPL-CCNC: 111 U/L — SIGNIFICANT CHANGE UP (ref 40–120)
ALT FLD-CCNC: 74 U/L — HIGH
ALT FLD-CCNC: 74 U/L — HIGH
ANION GAP SERPL CALC-SCNC: 13 MMOL/L — SIGNIFICANT CHANGE UP (ref 5–17)
ANION GAP SERPL CALC-SCNC: 13 MMOL/L — SIGNIFICANT CHANGE UP (ref 5–17)
AST SERPL-CCNC: 42 U/L — HIGH
AST SERPL-CCNC: 42 U/L — HIGH
BASOPHILS # BLD AUTO: 0.02 K/UL — SIGNIFICANT CHANGE UP (ref 0–0.2)
BASOPHILS # BLD AUTO: 0.02 K/UL — SIGNIFICANT CHANGE UP (ref 0–0.2)
BASOPHILS NFR BLD AUTO: 0.2 % — SIGNIFICANT CHANGE UP (ref 0–2)
BASOPHILS NFR BLD AUTO: 0.2 % — SIGNIFICANT CHANGE UP (ref 0–2)
BILIRUB SERPL-MCNC: 0.4 MG/DL — SIGNIFICANT CHANGE UP (ref 0.4–2)
BILIRUB SERPL-MCNC: 0.4 MG/DL — SIGNIFICANT CHANGE UP (ref 0.4–2)
BUN SERPL-MCNC: 8.3 MG/DL — SIGNIFICANT CHANGE UP (ref 8–20)
BUN SERPL-MCNC: 8.3 MG/DL — SIGNIFICANT CHANGE UP (ref 8–20)
CALCIUM SERPL-MCNC: 9.7 MG/DL — SIGNIFICANT CHANGE UP (ref 8.4–10.5)
CALCIUM SERPL-MCNC: 9.7 MG/DL — SIGNIFICANT CHANGE UP (ref 8.4–10.5)
CHLORIDE SERPL-SCNC: 101 MMOL/L — SIGNIFICANT CHANGE UP (ref 96–108)
CHLORIDE SERPL-SCNC: 101 MMOL/L — SIGNIFICANT CHANGE UP (ref 96–108)
CO2 SERPL-SCNC: 26 MMOL/L — SIGNIFICANT CHANGE UP (ref 22–29)
CO2 SERPL-SCNC: 26 MMOL/L — SIGNIFICANT CHANGE UP (ref 22–29)
CREAT SERPL-MCNC: 0.72 MG/DL — SIGNIFICANT CHANGE UP (ref 0.5–1.3)
CREAT SERPL-MCNC: 0.72 MG/DL — SIGNIFICANT CHANGE UP (ref 0.5–1.3)
EGFR: 131 ML/MIN/1.73M2 — SIGNIFICANT CHANGE UP
EGFR: 131 ML/MIN/1.73M2 — SIGNIFICANT CHANGE UP
EOSINOPHIL # BLD AUTO: 0.11 K/UL — SIGNIFICANT CHANGE UP (ref 0–0.5)
EOSINOPHIL # BLD AUTO: 0.11 K/UL — SIGNIFICANT CHANGE UP (ref 0–0.5)
EOSINOPHIL NFR BLD AUTO: 1.3 % — SIGNIFICANT CHANGE UP (ref 0–6)
EOSINOPHIL NFR BLD AUTO: 1.3 % — SIGNIFICANT CHANGE UP (ref 0–6)
GLUCOSE SERPL-MCNC: 102 MG/DL — HIGH (ref 70–99)
GLUCOSE SERPL-MCNC: 102 MG/DL — HIGH (ref 70–99)
HCT VFR BLD CALC: 42.2 % — SIGNIFICANT CHANGE UP (ref 39–50)
HCT VFR BLD CALC: 42.2 % — SIGNIFICANT CHANGE UP (ref 39–50)
HGB BLD-MCNC: 14.5 G/DL — SIGNIFICANT CHANGE UP (ref 13–17)
HGB BLD-MCNC: 14.5 G/DL — SIGNIFICANT CHANGE UP (ref 13–17)
IMM GRANULOCYTES NFR BLD AUTO: 0.2 % — SIGNIFICANT CHANGE UP (ref 0–0.9)
IMM GRANULOCYTES NFR BLD AUTO: 0.2 % — SIGNIFICANT CHANGE UP (ref 0–0.9)
LIDOCAIN IGE QN: 39 U/L — SIGNIFICANT CHANGE UP (ref 22–51)
LIDOCAIN IGE QN: 39 U/L — SIGNIFICANT CHANGE UP (ref 22–51)
LYMPHOCYTES # BLD AUTO: 1.89 K/UL — SIGNIFICANT CHANGE UP (ref 1–3.3)
LYMPHOCYTES # BLD AUTO: 1.89 K/UL — SIGNIFICANT CHANGE UP (ref 1–3.3)
LYMPHOCYTES # BLD AUTO: 23 % — SIGNIFICANT CHANGE UP (ref 13–44)
LYMPHOCYTES # BLD AUTO: 23 % — SIGNIFICANT CHANGE UP (ref 13–44)
MCHC RBC-ENTMCNC: 29.1 PG — SIGNIFICANT CHANGE UP (ref 27–34)
MCHC RBC-ENTMCNC: 29.1 PG — SIGNIFICANT CHANGE UP (ref 27–34)
MCHC RBC-ENTMCNC: 34.4 GM/DL — SIGNIFICANT CHANGE UP (ref 32–36)
MCHC RBC-ENTMCNC: 34.4 GM/DL — SIGNIFICANT CHANGE UP (ref 32–36)
MCV RBC AUTO: 84.6 FL — SIGNIFICANT CHANGE UP (ref 80–100)
MCV RBC AUTO: 84.6 FL — SIGNIFICANT CHANGE UP (ref 80–100)
MONOCYTES # BLD AUTO: 0.57 K/UL — SIGNIFICANT CHANGE UP (ref 0–0.9)
MONOCYTES # BLD AUTO: 0.57 K/UL — SIGNIFICANT CHANGE UP (ref 0–0.9)
MONOCYTES NFR BLD AUTO: 7 % — SIGNIFICANT CHANGE UP (ref 2–14)
MONOCYTES NFR BLD AUTO: 7 % — SIGNIFICANT CHANGE UP (ref 2–14)
NEUTROPHILS # BLD AUTO: 5.59 K/UL — SIGNIFICANT CHANGE UP (ref 1.8–7.4)
NEUTROPHILS # BLD AUTO: 5.59 K/UL — SIGNIFICANT CHANGE UP (ref 1.8–7.4)
NEUTROPHILS NFR BLD AUTO: 68.3 % — SIGNIFICANT CHANGE UP (ref 43–77)
NEUTROPHILS NFR BLD AUTO: 68.3 % — SIGNIFICANT CHANGE UP (ref 43–77)
PLATELET # BLD AUTO: 342 K/UL — SIGNIFICANT CHANGE UP (ref 150–400)
PLATELET # BLD AUTO: 342 K/UL — SIGNIFICANT CHANGE UP (ref 150–400)
POTASSIUM SERPL-MCNC: 4.2 MMOL/L — SIGNIFICANT CHANGE UP (ref 3.5–5.3)
POTASSIUM SERPL-MCNC: 4.2 MMOL/L — SIGNIFICANT CHANGE UP (ref 3.5–5.3)
POTASSIUM SERPL-SCNC: 4.2 MMOL/L — SIGNIFICANT CHANGE UP (ref 3.5–5.3)
POTASSIUM SERPL-SCNC: 4.2 MMOL/L — SIGNIFICANT CHANGE UP (ref 3.5–5.3)
PROT SERPL-MCNC: 7.8 G/DL — SIGNIFICANT CHANGE UP (ref 6.6–8.7)
PROT SERPL-MCNC: 7.8 G/DL — SIGNIFICANT CHANGE UP (ref 6.6–8.7)
RBC # BLD: 4.99 M/UL — SIGNIFICANT CHANGE UP (ref 4.2–5.8)
RBC # BLD: 4.99 M/UL — SIGNIFICANT CHANGE UP (ref 4.2–5.8)
RBC # FLD: 12 % — SIGNIFICANT CHANGE UP (ref 10.3–14.5)
RBC # FLD: 12 % — SIGNIFICANT CHANGE UP (ref 10.3–14.5)
SODIUM SERPL-SCNC: 140 MMOL/L — SIGNIFICANT CHANGE UP (ref 135–145)
SODIUM SERPL-SCNC: 140 MMOL/L — SIGNIFICANT CHANGE UP (ref 135–145)
WBC # BLD: 8.2 K/UL — SIGNIFICANT CHANGE UP (ref 3.8–10.5)
WBC # BLD: 8.2 K/UL — SIGNIFICANT CHANGE UP (ref 3.8–10.5)
WBC # FLD AUTO: 8.2 K/UL — SIGNIFICANT CHANGE UP (ref 3.8–10.5)
WBC # FLD AUTO: 8.2 K/UL — SIGNIFICANT CHANGE UP (ref 3.8–10.5)

## 2023-12-03 PROCEDURE — 80053 COMPREHEN METABOLIC PANEL: CPT

## 2023-12-03 PROCEDURE — 36415 COLL VENOUS BLD VENIPUNCTURE: CPT

## 2023-12-03 PROCEDURE — 96375 TX/PRO/DX INJ NEW DRUG ADDON: CPT

## 2023-12-03 PROCEDURE — 74177 CT ABD & PELVIS W/CONTRAST: CPT | Mod: MA

## 2023-12-03 PROCEDURE — 96374 THER/PROPH/DIAG INJ IV PUSH: CPT | Mod: XU

## 2023-12-03 PROCEDURE — 99284 EMERGENCY DEPT VISIT MOD MDM: CPT | Mod: 25

## 2023-12-03 PROCEDURE — 83690 ASSAY OF LIPASE: CPT

## 2023-12-03 PROCEDURE — 74177 CT ABD & PELVIS W/CONTRAST: CPT | Mod: 26,MA

## 2023-12-03 PROCEDURE — 85025 COMPLETE CBC W/AUTO DIFF WBC: CPT

## 2023-12-05 ENCOUNTER — APPOINTMENT (OUTPATIENT)
Dept: GASTROENTEROLOGY | Facility: CLINIC | Age: 23
End: 2023-12-05
Payer: MEDICAID

## 2023-12-05 VITALS
SYSTOLIC BLOOD PRESSURE: 130 MMHG | HEIGHT: 71 IN | BODY MASS INDEX: 25.2 KG/M2 | HEART RATE: 70 BPM | DIASTOLIC BLOOD PRESSURE: 70 MMHG | RESPIRATION RATE: 16 BRPM | WEIGHT: 180 LBS | OXYGEN SATURATION: 98 %

## 2023-12-05 PROCEDURE — 99203 OFFICE O/P NEW LOW 30 MIN: CPT

## 2023-12-06 ENCOUNTER — EMERGENCY (EMERGENCY)
Facility: HOSPITAL | Age: 23
LOS: 1 days | Discharge: HOME | End: 2023-12-06
Attending: STUDENT IN AN ORGANIZED HEALTH CARE EDUCATION/TRAINING PROGRAM
Payer: COMMERCIAL

## 2023-12-06 VITALS
TEMPERATURE: 98 F | OXYGEN SATURATION: 96 % | DIASTOLIC BLOOD PRESSURE: 82 MMHG | SYSTOLIC BLOOD PRESSURE: 133 MMHG | HEIGHT: 71 IN | RESPIRATION RATE: 17 BRPM | WEIGHT: 205.25 LBS | HEART RATE: 94 BPM

## 2023-12-06 VITALS
TEMPERATURE: 98 F | SYSTOLIC BLOOD PRESSURE: 124 MMHG | HEART RATE: 78 BPM | OXYGEN SATURATION: 98 % | DIASTOLIC BLOOD PRESSURE: 73 MMHG | RESPIRATION RATE: 16 BRPM

## 2023-12-06 LAB
ALBUMIN SERPL ELPH-MCNC: 4.6 G/DL — SIGNIFICANT CHANGE UP (ref 3.3–5.2)
ALBUMIN SERPL ELPH-MCNC: 4.6 G/DL — SIGNIFICANT CHANGE UP (ref 3.3–5.2)
ALP SERPL-CCNC: 103 U/L — SIGNIFICANT CHANGE UP (ref 40–120)
ALP SERPL-CCNC: 103 U/L — SIGNIFICANT CHANGE UP (ref 40–120)
ALT FLD-CCNC: 87 U/L — HIGH
ALT FLD-CCNC: 87 U/L — HIGH
ANION GAP SERPL CALC-SCNC: 13 MMOL/L — SIGNIFICANT CHANGE UP (ref 5–17)
ANION GAP SERPL CALC-SCNC: 13 MMOL/L — SIGNIFICANT CHANGE UP (ref 5–17)
AST SERPL-CCNC: 51 U/L — HIGH
AST SERPL-CCNC: 51 U/L — HIGH
BASE EXCESS BLDV CALC-SCNC: 4.2 MMOL/L — HIGH (ref -2–3)
BASE EXCESS BLDV CALC-SCNC: 4.2 MMOL/L — HIGH (ref -2–3)
BASOPHILS # BLD AUTO: 0.02 K/UL — SIGNIFICANT CHANGE UP (ref 0–0.2)
BASOPHILS # BLD AUTO: 0.02 K/UL — SIGNIFICANT CHANGE UP (ref 0–0.2)
BASOPHILS NFR BLD AUTO: 0.3 % — SIGNIFICANT CHANGE UP (ref 0–2)
BASOPHILS NFR BLD AUTO: 0.3 % — SIGNIFICANT CHANGE UP (ref 0–2)
BILIRUB SERPL-MCNC: 0.3 MG/DL — LOW (ref 0.4–2)
BILIRUB SERPL-MCNC: 0.3 MG/DL — LOW (ref 0.4–2)
BUN SERPL-MCNC: 8 MG/DL — SIGNIFICANT CHANGE UP (ref 8–20)
BUN SERPL-MCNC: 8 MG/DL — SIGNIFICANT CHANGE UP (ref 8–20)
CA-I SERPL-SCNC: 1.15 MMOL/L — SIGNIFICANT CHANGE UP (ref 1.15–1.33)
CA-I SERPL-SCNC: 1.15 MMOL/L — SIGNIFICANT CHANGE UP (ref 1.15–1.33)
CALCIUM SERPL-MCNC: 9.5 MG/DL — SIGNIFICANT CHANGE UP (ref 8.4–10.5)
CALCIUM SERPL-MCNC: 9.5 MG/DL — SIGNIFICANT CHANGE UP (ref 8.4–10.5)
CHLORIDE BLDV-SCNC: 102 MMOL/L — SIGNIFICANT CHANGE UP (ref 96–108)
CHLORIDE BLDV-SCNC: 102 MMOL/L — SIGNIFICANT CHANGE UP (ref 96–108)
CHLORIDE SERPL-SCNC: 99 MMOL/L — SIGNIFICANT CHANGE UP (ref 96–108)
CHLORIDE SERPL-SCNC: 99 MMOL/L — SIGNIFICANT CHANGE UP (ref 96–108)
CO2 SERPL-SCNC: 26 MMOL/L — SIGNIFICANT CHANGE UP (ref 22–29)
CO2 SERPL-SCNC: 26 MMOL/L — SIGNIFICANT CHANGE UP (ref 22–29)
CREAT SERPL-MCNC: 0.64 MG/DL — SIGNIFICANT CHANGE UP (ref 0.5–1.3)
CREAT SERPL-MCNC: 0.64 MG/DL — SIGNIFICANT CHANGE UP (ref 0.5–1.3)
EGFR: 136 ML/MIN/1.73M2 — SIGNIFICANT CHANGE UP
EGFR: 136 ML/MIN/1.73M2 — SIGNIFICANT CHANGE UP
EOSINOPHIL # BLD AUTO: 0.04 K/UL — SIGNIFICANT CHANGE UP (ref 0–0.5)
EOSINOPHIL # BLD AUTO: 0.04 K/UL — SIGNIFICANT CHANGE UP (ref 0–0.5)
EOSINOPHIL NFR BLD AUTO: 0.5 % — SIGNIFICANT CHANGE UP (ref 0–6)
EOSINOPHIL NFR BLD AUTO: 0.5 % — SIGNIFICANT CHANGE UP (ref 0–6)
GAS PNL BLDV: 137 MMOL/L — SIGNIFICANT CHANGE UP (ref 136–145)
GAS PNL BLDV: 137 MMOL/L — SIGNIFICANT CHANGE UP (ref 136–145)
GAS PNL BLDV: SIGNIFICANT CHANGE UP
GAS PNL BLDV: SIGNIFICANT CHANGE UP
GLUCOSE BLDV-MCNC: 146 MG/DL — HIGH (ref 70–99)
GLUCOSE BLDV-MCNC: 146 MG/DL — HIGH (ref 70–99)
GLUCOSE SERPL-MCNC: 147 MG/DL — HIGH (ref 70–99)
GLUCOSE SERPL-MCNC: 147 MG/DL — HIGH (ref 70–99)
HCO3 BLDV-SCNC: 28 MMOL/L — SIGNIFICANT CHANGE UP (ref 22–29)
HCO3 BLDV-SCNC: 28 MMOL/L — SIGNIFICANT CHANGE UP (ref 22–29)
HCT VFR BLD CALC: 40.5 % — SIGNIFICANT CHANGE UP (ref 39–50)
HCT VFR BLD CALC: 40.5 % — SIGNIFICANT CHANGE UP (ref 39–50)
HCT VFR BLDA CALC: 44 % — SIGNIFICANT CHANGE UP
HCT VFR BLDA CALC: 44 % — SIGNIFICANT CHANGE UP
HGB BLD CALC-MCNC: 14.7 G/DL — SIGNIFICANT CHANGE UP (ref 12.6–17.4)
HGB BLD CALC-MCNC: 14.7 G/DL — SIGNIFICANT CHANGE UP (ref 12.6–17.4)
HGB BLD-MCNC: 14.7 G/DL — SIGNIFICANT CHANGE UP (ref 13–17)
HGB BLD-MCNC: 14.7 G/DL — SIGNIFICANT CHANGE UP (ref 13–17)
HIV 1 & 2 AB SERPL IA.RAPID: SIGNIFICANT CHANGE UP
HIV 1 & 2 AB SERPL IA.RAPID: SIGNIFICANT CHANGE UP
IMM GRANULOCYTES NFR BLD AUTO: 0.3 % — SIGNIFICANT CHANGE UP (ref 0–0.9)
IMM GRANULOCYTES NFR BLD AUTO: 0.3 % — SIGNIFICANT CHANGE UP (ref 0–0.9)
LACTATE BLDV-MCNC: 2 MMOL/L — SIGNIFICANT CHANGE UP (ref 0.5–2)
LACTATE BLDV-MCNC: 2 MMOL/L — SIGNIFICANT CHANGE UP (ref 0.5–2)
LIDOCAIN IGE QN: 40 U/L — SIGNIFICANT CHANGE UP (ref 22–51)
LIDOCAIN IGE QN: 40 U/L — SIGNIFICANT CHANGE UP (ref 22–51)
LYMPHOCYTES # BLD AUTO: 1.82 K/UL — SIGNIFICANT CHANGE UP (ref 1–3.3)
LYMPHOCYTES # BLD AUTO: 1.82 K/UL — SIGNIFICANT CHANGE UP (ref 1–3.3)
LYMPHOCYTES # BLD AUTO: 23.1 % — SIGNIFICANT CHANGE UP (ref 13–44)
LYMPHOCYTES # BLD AUTO: 23.1 % — SIGNIFICANT CHANGE UP (ref 13–44)
MCHC RBC-ENTMCNC: 29.7 PG — SIGNIFICANT CHANGE UP (ref 27–34)
MCHC RBC-ENTMCNC: 29.7 PG — SIGNIFICANT CHANGE UP (ref 27–34)
MCHC RBC-ENTMCNC: 36.3 GM/DL — HIGH (ref 32–36)
MCHC RBC-ENTMCNC: 36.3 GM/DL — HIGH (ref 32–36)
MCV RBC AUTO: 81.8 FL — SIGNIFICANT CHANGE UP (ref 80–100)
MCV RBC AUTO: 81.8 FL — SIGNIFICANT CHANGE UP (ref 80–100)
MONOCYTES # BLD AUTO: 0.41 K/UL — SIGNIFICANT CHANGE UP (ref 0–0.9)
MONOCYTES # BLD AUTO: 0.41 K/UL — SIGNIFICANT CHANGE UP (ref 0–0.9)
MONOCYTES NFR BLD AUTO: 5.2 % — SIGNIFICANT CHANGE UP (ref 2–14)
MONOCYTES NFR BLD AUTO: 5.2 % — SIGNIFICANT CHANGE UP (ref 2–14)
NEUTROPHILS # BLD AUTO: 5.56 K/UL — SIGNIFICANT CHANGE UP (ref 1.8–7.4)
NEUTROPHILS # BLD AUTO: 5.56 K/UL — SIGNIFICANT CHANGE UP (ref 1.8–7.4)
NEUTROPHILS NFR BLD AUTO: 70.6 % — SIGNIFICANT CHANGE UP (ref 43–77)
NEUTROPHILS NFR BLD AUTO: 70.6 % — SIGNIFICANT CHANGE UP (ref 43–77)
PCO2 BLDV: 43 MMHG — SIGNIFICANT CHANGE UP (ref 42–55)
PCO2 BLDV: 43 MMHG — SIGNIFICANT CHANGE UP (ref 42–55)
PH BLDV: 7.43 — SIGNIFICANT CHANGE UP (ref 7.32–7.43)
PH BLDV: 7.43 — SIGNIFICANT CHANGE UP (ref 7.32–7.43)
PLATELET # BLD AUTO: 336 K/UL — SIGNIFICANT CHANGE UP (ref 150–400)
PLATELET # BLD AUTO: 336 K/UL — SIGNIFICANT CHANGE UP (ref 150–400)
PO2 BLDV: 142 MMHG — HIGH (ref 25–45)
PO2 BLDV: 142 MMHG — HIGH (ref 25–45)
POTASSIUM BLDV-SCNC: 3.5 MMOL/L — SIGNIFICANT CHANGE UP (ref 3.5–5.1)
POTASSIUM BLDV-SCNC: 3.5 MMOL/L — SIGNIFICANT CHANGE UP (ref 3.5–5.1)
POTASSIUM SERPL-MCNC: 3.5 MMOL/L — SIGNIFICANT CHANGE UP (ref 3.5–5.3)
POTASSIUM SERPL-MCNC: 3.5 MMOL/L — SIGNIFICANT CHANGE UP (ref 3.5–5.3)
POTASSIUM SERPL-SCNC: 3.5 MMOL/L — SIGNIFICANT CHANGE UP (ref 3.5–5.3)
POTASSIUM SERPL-SCNC: 3.5 MMOL/L — SIGNIFICANT CHANGE UP (ref 3.5–5.3)
PROT SERPL-MCNC: 7.5 G/DL — SIGNIFICANT CHANGE UP (ref 6.6–8.7)
PROT SERPL-MCNC: 7.5 G/DL — SIGNIFICANT CHANGE UP (ref 6.6–8.7)
RBC # BLD: 4.95 M/UL — SIGNIFICANT CHANGE UP (ref 4.2–5.8)
RBC # BLD: 4.95 M/UL — SIGNIFICANT CHANGE UP (ref 4.2–5.8)
RBC # FLD: 12.1 % — SIGNIFICANT CHANGE UP (ref 10.3–14.5)
RBC # FLD: 12.1 % — SIGNIFICANT CHANGE UP (ref 10.3–14.5)
SAO2 % BLDV: 100 % — SIGNIFICANT CHANGE UP
SAO2 % BLDV: 100 % — SIGNIFICANT CHANGE UP
SODIUM SERPL-SCNC: 138 MMOL/L — SIGNIFICANT CHANGE UP (ref 135–145)
SODIUM SERPL-SCNC: 138 MMOL/L — SIGNIFICANT CHANGE UP (ref 135–145)
WBC # BLD: 7.87 K/UL — SIGNIFICANT CHANGE UP (ref 3.8–10.5)
WBC # BLD: 7.87 K/UL — SIGNIFICANT CHANGE UP (ref 3.8–10.5)
WBC # FLD AUTO: 7.87 K/UL — SIGNIFICANT CHANGE UP (ref 3.8–10.5)
WBC # FLD AUTO: 7.87 K/UL — SIGNIFICANT CHANGE UP (ref 3.8–10.5)

## 2023-12-06 PROCEDURE — 93010 ELECTROCARDIOGRAM REPORT: CPT

## 2023-12-06 PROCEDURE — 99285 EMERGENCY DEPT VISIT HI MDM: CPT

## 2023-12-06 PROCEDURE — 93005 ELECTROCARDIOGRAM TRACING: CPT

## 2023-12-06 PROCEDURE — 82435 ASSAY OF BLOOD CHLORIDE: CPT

## 2023-12-06 PROCEDURE — 36415 COLL VENOUS BLD VENIPUNCTURE: CPT

## 2023-12-06 PROCEDURE — 86703 HIV-1/HIV-2 1 RESULT ANTBDY: CPT

## 2023-12-06 PROCEDURE — 99284 EMERGENCY DEPT VISIT MOD MDM: CPT | Mod: 25

## 2023-12-06 PROCEDURE — 82947 ASSAY GLUCOSE BLOOD QUANT: CPT

## 2023-12-06 PROCEDURE — 85018 HEMOGLOBIN: CPT

## 2023-12-06 PROCEDURE — 96374 THER/PROPH/DIAG INJ IV PUSH: CPT

## 2023-12-06 PROCEDURE — 85025 COMPLETE CBC W/AUTO DIFF WBC: CPT

## 2023-12-06 PROCEDURE — 84295 ASSAY OF SERUM SODIUM: CPT

## 2023-12-06 PROCEDURE — 82330 ASSAY OF CALCIUM: CPT

## 2023-12-06 PROCEDURE — 82803 BLOOD GASES ANY COMBINATION: CPT

## 2023-12-06 PROCEDURE — 80053 COMPREHEN METABOLIC PANEL: CPT

## 2023-12-06 PROCEDURE — 83605 ASSAY OF LACTIC ACID: CPT

## 2023-12-06 PROCEDURE — 85014 HEMATOCRIT: CPT

## 2023-12-06 PROCEDURE — 96375 TX/PRO/DX INJ NEW DRUG ADDON: CPT

## 2023-12-06 PROCEDURE — 84132 ASSAY OF SERUM POTASSIUM: CPT

## 2023-12-06 PROCEDURE — 83690 ASSAY OF LIPASE: CPT

## 2023-12-06 RX ORDER — SODIUM CHLORIDE 9 MG/ML
2000 INJECTION, SOLUTION INTRAVENOUS ONCE
Refills: 0 | Status: COMPLETED | OUTPATIENT
Start: 2023-12-06 | End: 2023-12-06

## 2023-12-06 RX ORDER — DIPHENHYDRAMINE HCL 50 MG
25 CAPSULE ORAL ONCE
Refills: 0 | Status: COMPLETED | OUTPATIENT
Start: 2023-12-06 | End: 2023-12-06

## 2023-12-06 RX ORDER — LIDOCAINE 4 G/100G
10 CREAM TOPICAL ONCE
Refills: 0 | Status: COMPLETED | OUTPATIENT
Start: 2023-12-06 | End: 2023-12-06

## 2023-12-06 RX ORDER — METOCLOPRAMIDE HCL 10 MG
10 TABLET ORAL ONCE
Refills: 0 | Status: COMPLETED | OUTPATIENT
Start: 2023-12-06 | End: 2023-12-06

## 2023-12-06 RX ORDER — FAMOTIDINE 10 MG/ML
20 INJECTION INTRAVENOUS ONCE
Refills: 0 | Status: COMPLETED | OUTPATIENT
Start: 2023-12-06 | End: 2023-12-06

## 2023-12-06 RX ADMIN — SODIUM CHLORIDE 2000 MILLILITER(S): 9 INJECTION, SOLUTION INTRAVENOUS at 20:44

## 2023-12-06 RX ADMIN — Medication 30 MILLILITER(S): at 20:44

## 2023-12-06 RX ADMIN — FAMOTIDINE 20 MILLIGRAM(S): 10 INJECTION INTRAVENOUS at 20:44

## 2023-12-06 RX ADMIN — Medication 25 MILLIGRAM(S): at 20:45

## 2023-12-06 RX ADMIN — LIDOCAINE 10 MILLILITER(S): 4 CREAM TOPICAL at 20:44

## 2023-12-06 RX ADMIN — Medication 10 MILLIGRAM(S): at 20:45

## 2023-12-06 NOTE — ED PROVIDER NOTE - CLINICAL SUMMARY MEDICAL DECISION MAKING FREE TEXT BOX
22 yo healthy male presents with persistent diffuse abdominal pain with radiation into the upper back and neck s/p eating a small hamburger this morning. 24 yo healthy male presents with persistent diffuse abdominal pain with radiation into the upper back and neck s/p eating a small hamburger this morning.    Marina JIMENEZ : PT evaluated by intake physician. HPI/PE/ROS as noted above. Will follow up plan per intake physician  On reassessment symptoms are improved, Abdomen soft non-tender no RUQ ttp. Labs with mildly elevated LFTs similar to recent visit, CT earlier in week showed hepatic steatosis. Pt has seen outpt GI doctor and is taking pantoprazole daily as well as carafate q6h prn. states sx were okay until after eating a hamburger. Likely GERD, discussed dietary triggers to avoid. Pt provided with copy of all results and advised continued f/u with GI. return precautions. 22 yo healthy male presents with persistent diffuse abdominal pain with radiation into the upper back and neck s/p eating a small hamburger this morning.    Marina JIMENEZ : PT evaluated by intake physician. HPI/PE/ROS as noted above. Will follow up plan per intake physician  On reassessment symptoms are improved, Abdomen soft non-tender no RUQ ttp. Labs with mildly elevated LFTs similar to recent visit, CT earlier in week showed hepatic steatosis. Pt has seen outpt GI doctor and is taking pantoprazole daily as well as carafate q6h prn. states sx were okay until after eating a hamburger. Likely GERD, discussed dietary triggers to avoid. Pt provided with copy of all results and advised continued f/u with GI. return precautions.

## 2023-12-06 NOTE — ED PROVIDER NOTE - OBJECTIVE STATEMENT
22 yo healthy male presents with persistent diffuse abdominal pain with radiation into the upper back and neck s/p eating a small hamburger this morning. Patient was recently in the ED and diagnosed with gastritis s/p starting medication for a tooth infection. He states he has been well until today when he developed symptoms after eating the burger. He states he has been taking the medication prescribed without symptoms. Denies fever, chill, vomiting, diarrhea, or trauma.

## 2023-12-06 NOTE — ED ADULT NURSE NOTE - NSFALLUNIVINTERV_ED_ALL_ED
Bed/Stretcher in lowest position, wheels locked, appropriate side rails in place/Call bell, personal items and telephone in reach/Instruct patient to call for assistance before getting out of bed/chair/stretcher/Non-slip footwear applied when patient is off stretcher/[place] to call system/Physically safe environment - no spills, clutter or unnecessary equipment/Purposeful proactive rounding/Room/bathroom lighting operational, light cord in reach Bed/Stretcher in lowest position, wheels locked, appropriate side rails in place/Call bell, personal items and telephone in reach/Instruct patient to call for assistance before getting out of bed/chair/stretcher/Non-slip footwear applied when patient is off stretcher/[place] to call system/Physically safe environment - no spills, clutter or unnecessary equipment/Purposeful proactive rounding/Room/bathroom lighting operational, light cord in reach

## 2023-12-06 NOTE — ED PROVIDER NOTE - ATTENDING APP SHARED VISIT CONTRIBUTION OF CARE
I, Zac Crooks, performed the initial face to face bedside interview with this patient regarding history of present illness, review of symptoms and relevant past medical, social and family history.  I completed an independent physical examination.  I was the initial provider who evaluated this patient.  The history, relevant review of systems, past medical and surgical history, medical decision making, and physical examination was documented by the scribe in my presence and I attest to the accuracy of the documentation.  I have signed out the follow up of any pending tests (i.e. labs, radiological studies) to the ACP.  I have communicated the patient’s plan of care and disposition with the ACP.

## 2023-12-06 NOTE — ED PROVIDER NOTE - PATIENT PORTAL LINK FT
You can access the FollowMyHealth Patient Portal offered by [facility] by registering at the following website: [email or web address]. By joining [employer]’s FollowMyHealth portal, you will also be able to view your health information using other applications (apps) compatible with our system. You can access the FollowMyHealth Patient Portal offered by [facility] by registering at the following website: [email or web address]. By joining [employer]’s FollowMyHealth portal, you will also be able to view your health information using other applications (apps) compatible with our system.

## 2023-12-06 NOTE — ED PROVIDER NOTE - NSFOLLOWUPINSTRUCTIONS_ED_ALL_ED_FT
1) Follow up with your doctor in 1-2 weeks  2) Return to the ER for worsening or concerning symptoms  3)take medication as prescribed    Gastroesophageal Reflux Disease, Adult       Gastroesophageal reflux (MORIAH) happens when acid from the stomach flows up into the tube that connects the mouth and the stomach (esophagus). Normally, food travels down the esophagus and stays in the stomach to be digested. However, when a person has MORIAH, food and stomach acid sometimes move back up into the esophagus. If this becomes a more serious problem, the person may be diagnosed with a disease called gastroesophageal reflux disease (GERD). GERD occurs when the reflux:    Happens often.   Causes frequent or severe symptoms.   Causes problems such as damage to the esophagus.    When stomach acid comes in contact with the esophagus, the acid may cause soreness (inflammation) in the esophagus. Over time, GERD may create small holes (ulcers) in the lining of the esophagus.    What are the causes?  This condition is caused by a problem with the muscle between the esophagus and the stomach (lower esophageal sphincter, or LES). Normally, the LES muscle closes after food passes through the esophagus to the stomach. When the LES is weakened or abnormal, it does not close properly, and that allows food and stomach acid to go back up into the esophagus.    The LES can be weakened by certain dietary substances, medicines, and medical conditions, including:    Tobacco use.  Pregnancy.  Having a hiatal hernia.  Alcohol use.  Certain foods and beverages, such as coffee, chocolate, onions, and peppermint.    What increases the risk?  You are more likely to develop this condition if you:    Have an increased body weight.  Have a connective tissue disorder.  Use NSAID medicines.    What are the signs or symptoms?  Symptoms of this condition include:    Heartburn.  Difficult or painful swallowing.  The feeling of having a lump in the throat.  A bitter taste in the mouth.  Bad breath.  Having a large amount of saliva.  Having an upset or bloated stomach.  Belching.  Chest pain. Different conditions can cause chest pain. Make sure you see your health care provider if you experience chest pain.  Shortness of breath or wheezing.  Ongoing (chronic) cough or a night-time cough.  Wearing away of tooth enamel.  Weight loss.    How is this diagnosed?  Your health care provider will take a medical history and perform a physical exam. To determine if you have mild or severe GERD, your health care provider may also monitor how you respond to treatment. You may also have tests, including:    A test to examine your stomach and esophagus with a small camera (endoscopy).  A test that measures the acidity level in your esophagus.  A test that measures how much pressure is on your esophagus.  A barium swallow or modified barium swallow test to show the shape, size, and functioning of your esophagus.    How is this treated?  The goal of treatment is to help relieve your symptoms and to prevent complications. Treatment for this condition may vary depending on how severe your symptoms are. Your health care provider may recommend:    Changes to your diet.  Medicine.  Surgery.    Follow these instructions at home:      Eating and drinking     Follow a diet as recommended by your health care provider. This may involve avoiding foods and drinks such as:    Coffee and tea (with or without caffeine).  Drinks that contain alcohol.  Energy drinks and sports drinks.  Carbonated drinks or sodas.  Chocolate and cocoa.  Peppermint and mint flavorings.  Garlic and onions.  Horseradish.  Spicy and acidic foods, including peppers, chili powder, santos powder, vinegar, hot sauces, and barbecue sauce.  Citrus fruit juices and citrus fruits, such as oranges, brandon, and limes.  Tomato-based foods, such as red sauce, chili, salsa, and pizza with red sauce.  Fried and fatty foods, such as donuts, french fries, potato chips, and high-fat dressings.  High-fat meats, such as hot dogs and fatty cuts of red and white meats, such as rib eye steak, sausage, ham, and govea.  High-fat dairy items, such as whole milk, butter, and cream cheese.  Eat small, frequent meals instead of large meals.  Avoid drinking large amounts of liquid with your meals.  Avoid eating meals during the 2–3 hours before bedtime.  Avoid lying down right after you eat.  Do not exercise right after you eat.        Lifestyle     Do not use any products that contain nicotine or tobacco, such as cigarettes, e-cigarettes, and chewing tobacco. If you need help quitting, ask your health care provider.  Try to reduce your stress by using methods such as yoga or meditation. If you need help reducing stress, ask your health care provider.  If you are overweight, reduce your weight to an amount that is healthy for you. Ask your health care provider for guidance about a safe weight loss goal.        General instructions    Pay attention to any changes in your symptoms.  Take over-the-counter and prescription medicines only as told by your health care provider. Do not take aspirin, ibuprofen, or other NSAIDs unless your health care provider told you to do so.  Wear loose-fitting clothing. Do not wear anything tight around your waist that causes pressure on your abdomen.  Raise (elevate) the head of your bed about 6 inches (15 cm).  Avoid bending over if this makes your symptoms worse.  Keep all follow-up visits as told by your health care provider. This is important.    Contact a health care provider if:  You have:    New symptoms.  Unexplained weight loss.  Difficulty swallowing or it hurts to swallow.  Wheezing or a persistent cough.  A hoarse voice.  Your symptoms do not improve with treatment.    Get help right away if you:  Have pain in your arms, neck, jaw, teeth, or back.  Feel sweaty, dizzy, or light-headed.  Have chest pain or shortness of breath.  Vomit and your vomit looks like blood or coffee grounds.  Faint.  Have stool that is bloody or black.  Cannot swallow, drink, or eat.    Summary  Gastroesophageal reflux happens when acid from the stomach flows up into the esophagus. GERD is a disease in which the reflux happens often, causes frequent or severe symptoms, or causes problems such as damage to the esophagus.  Treatment for this condition may vary depending on how severe your symptoms are. Your health care provider may recommend diet and lifestyle changes, medicine, or surgery.  Contact a health care provider if you have new or worsening symptoms.  Take over-the-counter and prescription medicines only as told by your health care provider. Do not take aspirin, ibuprofen, or other NSAIDs unless your health care provider told you to do so.  Keep all follow-up visits as told by your health care provider. This is important.    ADDITIONAL NOTES AND INSTRUCTIONS    Please follow up with your Primary MD in 24-48 hr.  Seek immediate medical care for any new/worsening signs or symptoms. 1) Follow up with your doctor in 1-2 weeks  2) Return to the ER for worsening or concerning symptoms  3)Continue to take your medication as prescribed    Gastroesophageal Reflux Disease, Adult       Gastroesophageal reflux (MORIAH) happens when acid from the stomach flows up into the tube that connects the mouth and the stomach (esophagus). Normally, food travels down the esophagus and stays in the stomach to be digested. However, when a person has MORIAH, food and stomach acid sometimes move back up into the esophagus. If this becomes a more serious problem, the person may be diagnosed with a disease called gastroesophageal reflux disease (GERD). GERD occurs when the reflux:    Happens often.   Causes frequent or severe symptoms.   Causes problems such as damage to the esophagus.    When stomach acid comes in contact with the esophagus, the acid may cause soreness (inflammation) in the esophagus. Over time, GERD may create small holes (ulcers) in the lining of the esophagus.    What are the causes?  This condition is caused by a problem with the muscle between the esophagus and the stomach (lower esophageal sphincter, or LES). Normally, the LES muscle closes after food passes through the esophagus to the stomach. When the LES is weakened or abnormal, it does not close properly, and that allows food and stomach acid to go back up into the esophagus.    The LES can be weakened by certain dietary substances, medicines, and medical conditions, including:    Tobacco use.  Pregnancy.  Having a hiatal hernia.  Alcohol use.  Certain foods and beverages, such as coffee, chocolate, onions, and peppermint.    What increases the risk?  You are more likely to develop this condition if you:    Have an increased body weight.  Have a connective tissue disorder.  Use NSAID medicines.    What are the signs or symptoms?  Symptoms of this condition include:    Heartburn.  Difficult or painful swallowing.  The feeling of having a lump in the throat.  A bitter taste in the mouth.  Bad breath.  Having a large amount of saliva.  Having an upset or bloated stomach.  Belching.  Chest pain. Different conditions can cause chest pain. Make sure you see your health care provider if you experience chest pain.  Shortness of breath or wheezing.  Ongoing (chronic) cough or a night-time cough.  Wearing away of tooth enamel.  Weight loss.    How is this diagnosed?  Your health care provider will take a medical history and perform a physical exam. To determine if you have mild or severe GERD, your health care provider may also monitor how you respond to treatment. You may also have tests, including:    A test to examine your stomach and esophagus with a small camera (endoscopy).  A test that measures the acidity level in your esophagus.  A test that measures how much pressure is on your esophagus.  A barium swallow or modified barium swallow test to show the shape, size, and functioning of your esophagus.    How is this treated?  The goal of treatment is to help relieve your symptoms and to prevent complications. Treatment for this condition may vary depending on how severe your symptoms are. Your health care provider may recommend:    Changes to your diet.  Medicine.  Surgery.    Follow these instructions at home:      Eating and drinking     Follow a diet as recommended by your health care provider. This may involve avoiding foods and drinks such as:    Coffee and tea (with or without caffeine).  Drinks that contain alcohol.  Energy drinks and sports drinks.  Carbonated drinks or sodas.  Chocolate and cocoa.  Peppermint and mint flavorings.  Garlic and onions.  Horseradish.  Spicy and acidic foods, including peppers, chili powder, santos powder, vinegar, hot sauces, and barbecue sauce.  Citrus fruit juices and citrus fruits, such as oranges, brandon, and limes.  Tomato-based foods, such as red sauce, chili, salsa, and pizza with red sauce.  Fried and fatty foods, such as donuts, french fries, potato chips, and high-fat dressings.  High-fat meats, such as hot dogs and fatty cuts of red and white meats, such as rib eye steak, sausage, ham, and govea.  High-fat dairy items, such as whole milk, butter, and cream cheese.  Eat small, frequent meals instead of large meals.  Avoid drinking large amounts of liquid with your meals.  Avoid eating meals during the 2–3 hours before bedtime.  Avoid lying down right after you eat.  Do not exercise right after you eat.        Lifestyle     Do not use any products that contain nicotine or tobacco, such as cigarettes, e-cigarettes, and chewing tobacco. If you need help quitting, ask your health care provider.  Try to reduce your stress by using methods such as yoga or meditation. If you need help reducing stress, ask your health care provider.  If you are overweight, reduce your weight to an amount that is healthy for you. Ask your health care provider for guidance about a safe weight loss goal.        General instructions    Pay attention to any changes in your symptoms.  Take over-the-counter and prescription medicines only as told by your health care provider. Do not take aspirin, ibuprofen, or other NSAIDs unless your health care provider told you to do so.  Wear loose-fitting clothing. Do not wear anything tight around your waist that causes pressure on your abdomen.  Raise (elevate) the head of your bed about 6 inches (15 cm).  Avoid bending over if this makes your symptoms worse.  Keep all follow-up visits as told by your health care provider. This is important.    Contact a health care provider if:  You have:    New symptoms.  Unexplained weight loss.  Difficulty swallowing or it hurts to swallow.  Wheezing or a persistent cough.  A hoarse voice.  Your symptoms do not improve with treatment.    Get help right away if you:  Have pain in your arms, neck, jaw, teeth, or back.  Feel sweaty, dizzy, or light-headed.  Have chest pain or shortness of breath.  Vomit and your vomit looks like blood or coffee grounds.  Faint.  Have stool that is bloody or black.  Cannot swallow, drink, or eat.    Summary  Gastroesophageal reflux happens when acid from the stomach flows up into the esophagus. GERD is a disease in which the reflux happens often, causes frequent or severe symptoms, or causes problems such as damage to the esophagus.  Treatment for this condition may vary depending on how severe your symptoms are. Your health care provider may recommend diet and lifestyle changes, medicine, or surgery.  Contact a health care provider if you have new or worsening symptoms.  Take over-the-counter and prescription medicines only as told by your health care provider. Do not take aspirin, ibuprofen, or other NSAIDs unless your health care provider told you to do so.  Keep all follow-up visits as told by your health care provider. This is important.    ADDITIONAL NOTES AND INSTRUCTIONS    Please follow up with your Primary MD in 24-48 hr.  Seek immediate medical care for any new/worsening signs or symptoms.

## 2023-12-06 NOTE — ED ADULT NURSE NOTE - NSFALLRISKASMTTYPE_ED_ALL_ED
Initial (On Arrival)

## 2023-12-06 NOTE — ED ADULT TRIAGE NOTE - CHIEF COMPLAINT QUOTE
Pt c/o abdominal pain w/dizziness/N/diarrhea, onset this morning, pain 10/10 describes as squeezing.  Pt states he feels like fainting.  No pertinent medical hx.  EKG in triage.

## 2023-12-23 ENCOUNTER — EMERGENCY (EMERGENCY)
Facility: HOSPITAL | Age: 23
LOS: 1 days | Discharge: HOME | End: 2023-12-23
Attending: STUDENT IN AN ORGANIZED HEALTH CARE EDUCATION/TRAINING PROGRAM
Payer: COMMERCIAL

## 2023-12-23 VITALS
SYSTOLIC BLOOD PRESSURE: 135 MMHG | HEART RATE: 141 BPM | DIASTOLIC BLOOD PRESSURE: 80 MMHG | TEMPERATURE: 98 F | HEIGHT: 71 IN | RESPIRATION RATE: 16 BRPM | OXYGEN SATURATION: 98 % | WEIGHT: 197.75 LBS

## 2023-12-23 VITALS
RESPIRATION RATE: 16 BRPM | DIASTOLIC BLOOD PRESSURE: 63 MMHG | TEMPERATURE: 98 F | SYSTOLIC BLOOD PRESSURE: 103 MMHG | HEART RATE: 76 BPM | OXYGEN SATURATION: 97 %

## 2023-12-23 LAB
ALBUMIN SERPL ELPH-MCNC: 4.8 G/DL — SIGNIFICANT CHANGE UP (ref 3.3–5.2)
ALBUMIN SERPL ELPH-MCNC: 4.8 G/DL — SIGNIFICANT CHANGE UP (ref 3.3–5.2)
ALP SERPL-CCNC: 93 U/L — SIGNIFICANT CHANGE UP (ref 40–120)
ALP SERPL-CCNC: 93 U/L — SIGNIFICANT CHANGE UP (ref 40–120)
ALT FLD-CCNC: 170 U/L — HIGH
ALT FLD-CCNC: 170 U/L — HIGH
ANION GAP SERPL CALC-SCNC: 13 MMOL/L — SIGNIFICANT CHANGE UP (ref 5–17)
ANION GAP SERPL CALC-SCNC: 13 MMOL/L — SIGNIFICANT CHANGE UP (ref 5–17)
AST SERPL-CCNC: 69 U/L — HIGH
AST SERPL-CCNC: 69 U/L — HIGH
BASOPHILS # BLD AUTO: 0.01 K/UL — SIGNIFICANT CHANGE UP (ref 0–0.2)
BASOPHILS # BLD AUTO: 0.01 K/UL — SIGNIFICANT CHANGE UP (ref 0–0.2)
BASOPHILS NFR BLD AUTO: 0.1 % — SIGNIFICANT CHANGE UP (ref 0–2)
BASOPHILS NFR BLD AUTO: 0.1 % — SIGNIFICANT CHANGE UP (ref 0–2)
BILIRUB SERPL-MCNC: 0.3 MG/DL — LOW (ref 0.4–2)
BILIRUB SERPL-MCNC: 0.3 MG/DL — LOW (ref 0.4–2)
BUN SERPL-MCNC: 5.9 MG/DL — LOW (ref 8–20)
BUN SERPL-MCNC: 5.9 MG/DL — LOW (ref 8–20)
CALCIUM SERPL-MCNC: 9.9 MG/DL — SIGNIFICANT CHANGE UP (ref 8.4–10.5)
CALCIUM SERPL-MCNC: 9.9 MG/DL — SIGNIFICANT CHANGE UP (ref 8.4–10.5)
CHLORIDE SERPL-SCNC: 102 MMOL/L — SIGNIFICANT CHANGE UP (ref 96–108)
CHLORIDE SERPL-SCNC: 102 MMOL/L — SIGNIFICANT CHANGE UP (ref 96–108)
CO2 SERPL-SCNC: 25 MMOL/L — SIGNIFICANT CHANGE UP (ref 22–29)
CO2 SERPL-SCNC: 25 MMOL/L — SIGNIFICANT CHANGE UP (ref 22–29)
CREAT SERPL-MCNC: 0.75 MG/DL — SIGNIFICANT CHANGE UP (ref 0.5–1.3)
CREAT SERPL-MCNC: 0.75 MG/DL — SIGNIFICANT CHANGE UP (ref 0.5–1.3)
EGFR: 129 ML/MIN/1.73M2 — SIGNIFICANT CHANGE UP
EGFR: 129 ML/MIN/1.73M2 — SIGNIFICANT CHANGE UP
EOSINOPHIL # BLD AUTO: 0.03 K/UL — SIGNIFICANT CHANGE UP (ref 0–0.5)
EOSINOPHIL # BLD AUTO: 0.03 K/UL — SIGNIFICANT CHANGE UP (ref 0–0.5)
EOSINOPHIL NFR BLD AUTO: 0.4 % — SIGNIFICANT CHANGE UP (ref 0–6)
EOSINOPHIL NFR BLD AUTO: 0.4 % — SIGNIFICANT CHANGE UP (ref 0–6)
GLUCOSE SERPL-MCNC: 99 MG/DL — SIGNIFICANT CHANGE UP (ref 70–99)
GLUCOSE SERPL-MCNC: 99 MG/DL — SIGNIFICANT CHANGE UP (ref 70–99)
HCT VFR BLD CALC: 42.8 % — SIGNIFICANT CHANGE UP (ref 39–50)
HCT VFR BLD CALC: 42.8 % — SIGNIFICANT CHANGE UP (ref 39–50)
HGB BLD-MCNC: 14.6 G/DL — SIGNIFICANT CHANGE UP (ref 13–17)
HGB BLD-MCNC: 14.6 G/DL — SIGNIFICANT CHANGE UP (ref 13–17)
IMM GRANULOCYTES NFR BLD AUTO: 0.3 % — SIGNIFICANT CHANGE UP (ref 0–0.9)
IMM GRANULOCYTES NFR BLD AUTO: 0.3 % — SIGNIFICANT CHANGE UP (ref 0–0.9)
LIDOCAIN IGE QN: 36 U/L — SIGNIFICANT CHANGE UP (ref 22–51)
LIDOCAIN IGE QN: 36 U/L — SIGNIFICANT CHANGE UP (ref 22–51)
LYMPHOCYTES # BLD AUTO: 0.85 K/UL — LOW (ref 1–3.3)
LYMPHOCYTES # BLD AUTO: 0.85 K/UL — LOW (ref 1–3.3)
LYMPHOCYTES # BLD AUTO: 12 % — LOW (ref 13–44)
LYMPHOCYTES # BLD AUTO: 12 % — LOW (ref 13–44)
MCHC RBC-ENTMCNC: 29 PG — SIGNIFICANT CHANGE UP (ref 27–34)
MCHC RBC-ENTMCNC: 29 PG — SIGNIFICANT CHANGE UP (ref 27–34)
MCHC RBC-ENTMCNC: 34.1 GM/DL — SIGNIFICANT CHANGE UP (ref 32–36)
MCHC RBC-ENTMCNC: 34.1 GM/DL — SIGNIFICANT CHANGE UP (ref 32–36)
MCV RBC AUTO: 84.9 FL — SIGNIFICANT CHANGE UP (ref 80–100)
MCV RBC AUTO: 84.9 FL — SIGNIFICANT CHANGE UP (ref 80–100)
MONOCYTES # BLD AUTO: 0.54 K/UL — SIGNIFICANT CHANGE UP (ref 0–0.9)
MONOCYTES # BLD AUTO: 0.54 K/UL — SIGNIFICANT CHANGE UP (ref 0–0.9)
MONOCYTES NFR BLD AUTO: 7.6 % — SIGNIFICANT CHANGE UP (ref 2–14)
MONOCYTES NFR BLD AUTO: 7.6 % — SIGNIFICANT CHANGE UP (ref 2–14)
NEUTROPHILS # BLD AUTO: 5.61 K/UL — SIGNIFICANT CHANGE UP (ref 1.8–7.4)
NEUTROPHILS # BLD AUTO: 5.61 K/UL — SIGNIFICANT CHANGE UP (ref 1.8–7.4)
NEUTROPHILS NFR BLD AUTO: 79.6 % — HIGH (ref 43–77)
NEUTROPHILS NFR BLD AUTO: 79.6 % — HIGH (ref 43–77)
PLATELET # BLD AUTO: 299 K/UL — SIGNIFICANT CHANGE UP (ref 150–400)
PLATELET # BLD AUTO: 299 K/UL — SIGNIFICANT CHANGE UP (ref 150–400)
POTASSIUM SERPL-MCNC: 4.1 MMOL/L — SIGNIFICANT CHANGE UP (ref 3.5–5.3)
POTASSIUM SERPL-MCNC: 4.1 MMOL/L — SIGNIFICANT CHANGE UP (ref 3.5–5.3)
POTASSIUM SERPL-SCNC: 4.1 MMOL/L — SIGNIFICANT CHANGE UP (ref 3.5–5.3)
POTASSIUM SERPL-SCNC: 4.1 MMOL/L — SIGNIFICANT CHANGE UP (ref 3.5–5.3)
PROT SERPL-MCNC: 7.4 G/DL — SIGNIFICANT CHANGE UP (ref 6.6–8.7)
PROT SERPL-MCNC: 7.4 G/DL — SIGNIFICANT CHANGE UP (ref 6.6–8.7)
RBC # BLD: 5.04 M/UL — SIGNIFICANT CHANGE UP (ref 4.2–5.8)
RBC # BLD: 5.04 M/UL — SIGNIFICANT CHANGE UP (ref 4.2–5.8)
RBC # FLD: 12.5 % — SIGNIFICANT CHANGE UP (ref 10.3–14.5)
RBC # FLD: 12.5 % — SIGNIFICANT CHANGE UP (ref 10.3–14.5)
SODIUM SERPL-SCNC: 140 MMOL/L — SIGNIFICANT CHANGE UP (ref 135–145)
SODIUM SERPL-SCNC: 140 MMOL/L — SIGNIFICANT CHANGE UP (ref 135–145)
WBC # BLD: 7.06 K/UL — SIGNIFICANT CHANGE UP (ref 3.8–10.5)
WBC # BLD: 7.06 K/UL — SIGNIFICANT CHANGE UP (ref 3.8–10.5)
WBC # FLD AUTO: 7.06 K/UL — SIGNIFICANT CHANGE UP (ref 3.8–10.5)
WBC # FLD AUTO: 7.06 K/UL — SIGNIFICANT CHANGE UP (ref 3.8–10.5)

## 2023-12-23 PROCEDURE — 93005 ELECTROCARDIOGRAM TRACING: CPT

## 2023-12-23 PROCEDURE — 76705 ECHO EXAM OF ABDOMEN: CPT | Mod: 26

## 2023-12-23 PROCEDURE — 36415 COLL VENOUS BLD VENIPUNCTURE: CPT

## 2023-12-23 PROCEDURE — 83690 ASSAY OF LIPASE: CPT

## 2023-12-23 PROCEDURE — 76705 ECHO EXAM OF ABDOMEN: CPT

## 2023-12-23 PROCEDURE — 93010 ELECTROCARDIOGRAM REPORT: CPT

## 2023-12-23 PROCEDURE — 99284 EMERGENCY DEPT VISIT MOD MDM: CPT

## 2023-12-23 PROCEDURE — 85025 COMPLETE CBC W/AUTO DIFF WBC: CPT

## 2023-12-23 PROCEDURE — 80053 COMPREHEN METABOLIC PANEL: CPT

## 2023-12-23 PROCEDURE — 99285 EMERGENCY DEPT VISIT HI MDM: CPT | Mod: 25

## 2023-12-23 RX ADMIN — Medication 30 MILLILITER(S): at 16:17

## 2023-12-23 NOTE — ED ADULT NURSE REASSESSMENT NOTE - NS ED NURSE REASSESS COMMENT FT1
plan of care and results discussed with pt by md cantor. pt in no acute distress. vss ambulated off unit without difficulty.

## 2023-12-23 NOTE — ED PROVIDER NOTE - OBJECTIVE STATEMENT
22yo male with pmh of gastritis presents with abdominal pain. Pt states he had eggs with onions and as soon as he ate the onions he felt burning in his entire stomach and his throat. Pt also states he's had stool with red dots, showed a picture it was all yellow brown stool with one stool piece with a small red dot, no blood in the toilet water, no other signs of bleeding. Pt states he has episodes of diarrhea and then constipation intermittently. Pt states similar symptoms when he was here 2 weeks ago. Pt states he can't see GI until next month and is running out of meds given here and would like a refill. Pt with no acute findings on CT 2 weeks ago. Pt denies fevers/chills, ha, loc, focal neuro deficits, cp/sob/palp, cough, n/v/d, urinary symptoms, recent travel and sick contacts.

## 2023-12-23 NOTE — ED PROVIDER NOTE - NSFOLLOWUPINSTRUCTIONS_ED_ALL_ED_FT
Abdominal Pain    Many things can cause abdominal pain. Many times, abdominal pain is not caused by a disease and will improve without treatment. Your health care provider will do a physical exam to determine if there is a dangerous cause of your pain; blood tests and imaging may help determine the cause of your pain. However, in many cases, no cause may be found and you may need further testing as an outpatient. Monitor your abdominal pain for any changes.     SEEK IMMEDIATE MEDICAL CARE IF YOU HAVE ANY OF THE FOLLOWING SYMPTOMS: worsening abdominal pain, uncontrollable vomiting, profuse diarrhea, inability to have bowel movements or pass gas, black or bloody stools, fever accompanying chest pain or back pain, or fainting. These symptoms may represent a serious problem that is an emergency. Do not wait to see if the symptoms will go away. Get medical help right away. Call 911 and do not drive yourself to the hospital.    -Please follow-up with your primary care doctor and gastroenterologist in the next 2 days.  Please call tomorrow for an appointment.  If you cannot follow-up with your primary care doctor please return to the ED for any urgent issues.  - You were given a copy of the tests performed today.  Please bring the results with you and review them with your primary care doctor.  - If you have any worsening of symptoms or any other concerns please return to the ED immediately.  - Please continue taking your home medications as directed.

## 2023-12-23 NOTE — ED PROVIDER NOTE - CLINICAL SUMMARY MEDICAL DECISION MAKING FREE TEXT BOX
22yo male with pmh of gastritis presents with abdominal pain 24yo male with pmh of gastritis presents with abdominal pain 22yo male with pmh of gastritis presents with abdominal pain. Pt with mild elevation of lfts, pt found to have fatty liver on US, otherwise well appearing, benign abd, improved with maalox, instructed to modify diet and follow up with GI

## 2023-12-23 NOTE — ED PROVIDER NOTE - PHYSICAL EXAMINATION
Const: Awake, alert and oriented. In no acute distress. Well appearing.  HEENT: NC/AT. Moist mucous membranes.  Eyes: No scleral icterus. EOMI.  Neck:. Soft and supple. Full ROM without pain.  Cardiac: Regular rate and regular rhythm. +S1/S2. Peripheral pulses 2+ and symmetric. No LE edema.  Resp: Speaking in full sentences. No evidence of respiratory distress. No wheezes, rales or rhonchi.  Abd: Soft, upper abd ttp, non-distended. Normal bowel sounds in all 4 quadrants. No guarding or rebound.  Back: Spine midline and non-tender. No CVAT.  Skin: No rashes, abrasions or lacerations.  Lymph: No cervical lymphadenopathy.  Neuro: Awake, alert & oriented x 3. Moves all extremities symmetrically.

## 2023-12-23 NOTE — ED ADULT NURSE NOTE - OBJECTIVE STATEMENT
Aox4. Pt presenting to Emergency Department complaining of generalized abd pain, diarrhea, and blood stool. Pt states "I saw a red dot in my stool today". Denies chest pain, SOB, , back pain, headaches, dizziness, lightheadedness, fevers, chills, nausea, vomiting,, constipation and dysuria. Respirations even and unlabored. Skin warm to touch.

## 2023-12-23 NOTE — ED PROVIDER NOTE - PATIENT PORTAL LINK FT
You can access the FollowMyHealth Patient Portal offered by [facility] by registering at the following website: [email or web address]. By joining [employer]’s FollowMyHealth portal, you will also be able to view your health information using other applications (apps) compatible with our system. You can access the FollowMyHealth Patient Portal offered by [facility] by registering at the following website: [email or web address]. By joining [employer]’s FollowMyHealth portal, you will also be able to view your health information using other applications (apps) compatible with our system.

## 2024-01-02 ENCOUNTER — EMERGENCY (EMERGENCY)
Facility: HOSPITAL | Age: 24
LOS: 1 days | Discharge: HOME | End: 2024-01-02
Attending: STUDENT IN AN ORGANIZED HEALTH CARE EDUCATION/TRAINING PROGRAM
Payer: COMMERCIAL

## 2024-01-02 ENCOUNTER — APPOINTMENT (OUTPATIENT)
Dept: GASTROENTEROLOGY | Facility: CLINIC | Age: 24
End: 2024-01-02

## 2024-01-02 VITALS
TEMPERATURE: 98 F | HEART RATE: 127 BPM | DIASTOLIC BLOOD PRESSURE: 113 MMHG | RESPIRATION RATE: 18 BRPM | HEIGHT: 71 IN | OXYGEN SATURATION: 98 % | SYSTOLIC BLOOD PRESSURE: 149 MMHG

## 2024-01-02 VITALS — HEART RATE: 77 BPM | SYSTOLIC BLOOD PRESSURE: 115 MMHG | DIASTOLIC BLOOD PRESSURE: 69 MMHG

## 2024-01-02 PROCEDURE — 99283 EMERGENCY DEPT VISIT LOW MDM: CPT

## 2024-01-02 PROCEDURE — 93010 ELECTROCARDIOGRAM REPORT: CPT

## 2024-01-02 PROCEDURE — 93005 ELECTROCARDIOGRAM TRACING: CPT

## 2024-01-02 PROCEDURE — 99283 EMERGENCY DEPT VISIT LOW MDM: CPT | Mod: 25

## 2024-01-02 NOTE — ED PROVIDER NOTE - NSFOLLOWUPINSTRUCTIONS_ED_ALL_ED_FT
- Please call today to schedule follow up appointment with PMD and otolaryngologist.   - Please bring all documentation from your ED visit to any related future follow up appointment.  - Please call to schedule follow up appointment with your primary care physician within 24-48 hours.  - Please seek immediate medical attention or return to the ED for any new/worsening, signs/symptoms, or concerns.    Feel better!

## 2024-01-02 NOTE — ED PROVIDER NOTE - CLINICAL SUMMARY MEDICAL DECISION MAKING FREE TEXT BOX
24 yo male no PMHx presents to ED c/o jaw swelling x2 months. Additionally with "pulsations" in left ear. Saw ENT 1 month ago for same. Patient with frequent visits to ED in December. Absolutely no facial swelling. No signs of infections. Normal appearing ears. Medically stable for discharge. 22 yo male no PMHx presents to ED c/o jaw swelling x2 months. Additionally with "pulsations" in left ear. Saw ENT 1 month ago for same. Patient with frequent visits to ED in December. Absolutely no facial swelling. No signs of infections. Normal appearing ears. Medically stable for discharge.

## 2024-01-02 NOTE — ED ADULT NURSE NOTE - OBJECTIVE STATEMENT
Pt endorsing facial pain and swelling x2 months. States "I feel my heartbeat in my ear from the pain and I can't sleep". States he has had multiple dental infections in the past and needed abx.

## 2024-01-02 NOTE — ED ADULT NURSE NOTE - PRO INTERPRETER NEED 2
[FreeTextEntry1] : BENI  is a healthy, thriving 22 year old who presents without identified concerns for congestive heart failure nor impaired cardiac output by her  past medical history nor on her  current physical exam. her  EKG and echocardiogram were further reassuring. SHEA was incidentally noted to have trivial tricuspid, pulmonary and mitral regurgitation in otherwise well functioning valves. This was not audible on physical exam and not hemodynamically significant at this time. \par \par \par Ongoing stable mild dilation of her ascending aorta in the setting of a family history of bicuspid aortic valve ( brother). 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 Reviewed prior discussions of the potential for an inheritable bicuspid aortic valve associated aortopathy even in the presence of a trileaflet valve. Her aorta is not significantly dilated but warrants ongoing longitudinal surveillance given potential for progressive disease. \par \par \par Beni's brother (ronald) prior genetic work up for known mutations was negative. Discussed limitations on genetic testing and still the potential for inheritable disease with risks for first degree relatives; whom should be screened. Unfortunately, the yield of obtaining a positive genetic results in such clinical scenarios is only about 25 to 30%. Recommended following at this time:\par \par -Discussed benefits of maintaining normotension where hypertension should be avoided.\par -Discussed indications for medication management; given mild degree of dilation will monitor at this time.\par -A great deal of time was spent in counseling Ronald on his activity restrictions.  Though her ascending aorta is dilated, its dimension is only mildly increased. \par - I strongly cautioned her against participating in very strenuous isometric exercises including excessive push-ups, sit ups, pull-ups, and weight lifting. Light weight lifting with repetitions, for muscle toning is acceptable. Aerobic activities are encouraged.\par -Previously recommended Optho exam to rule out ectopia lentis. No apparent additional stigmata for a connective tissue disorder. Low Trenton score historically.\par \par \par I recommended 1 year follow up and we reviewed signs and symptoms that should prompt medical attention and sooner evaluation. Above information explained in detail with assistance of a colored diagram.  SHEA and family verbalized their understanding and all questions were answered.  [Needs SBE Prophylaxis] : [unfilled] does not need bacterial endocarditis prophylaxis English

## 2024-01-02 NOTE — ED PROVIDER NOTE - CARE PROVIDER_API CALL
Jonathon Díaz  Otolaryngology  [address] [facility], [address] [address]  Phone: [phone]  Fax: [phone]  Follow Up Time:    Jonathon Díaz  Otolaryngology  [address] [facility], [address] [address]  Phone: [phone]  Fax: [phone]  Follow Up Time:

## 2024-01-02 NOTE — ED PROVIDER NOTE - PATIENT PORTAL LINK FT
You can access the FollowMyHealth Patient Portal offered by [facility] by registering at the following website: [email or web address]. By joining [employer]’s FollowMyHealth portal, you will also be able to view your health information using other applications (apps) compatible with our system. You can access the FollowMyHealth Patient Portal offered by [facility] by registering at the following website: [email or web address]. By joining [employer]’s FollowMyHealth portal, you will also be able to view your health information using other applications (apps) compatible with our system.

## 2024-01-02 NOTE — ED PROVIDER NOTE - ATTENDING APP SHARED VISIT CONTRIBUTION OF CARE
I, Zac Crooks, personally saw the patient with the PA, and completed the key components of the history and physical exam. I then discussed the management plan with the PA.

## 2024-01-02 NOTE — ED PROVIDER NOTE - NSFOLLOWUPCLINICSTOKEN_GEN_ALL_ED_FT
[id number]: || ||00\01||False;[id number]: || ||00\01||False; [id number]: || ||00\01||False;[id number]: || ||00\01||False;

## 2024-01-02 NOTE — ED PROVIDER NOTE - OBJECTIVE STATEMENT
22 yo male no PMHx presents to ED c/o jaw swelling x2 months. Additionally with "pulsations" in left ear. Sxms worse with laying down. Saw ENT 1 month ago for same. Patient with frequent visits to ED in December. No further complaints at this time. 24 yo male no PMHx presents to ED c/o jaw swelling x2 months. Additionally with "pulsations" in left ear. Sxms worse with laying down. Saw ENT 1 month ago for same. Patient with frequent visits to ED in December. No further complaints at this time.

## 2024-01-02 NOTE — ED PROVIDER NOTE - NSICDXPASTSURGICALHX_GEN_ALL_CORE_FT
left facial numbness. decreased mobility of the left arm and leg, slurred speech
PAST SURGICAL HISTORY:  No significant past surgical history

## 2024-01-02 NOTE — ED ADULT TRIAGE NOTE - HEIGHT IN FEET
[FreeTextEntry1] : Assessment:\par GREGORY\par Obesity\par \par PLAN:\par The patient is benefitting from the PAP device .\par New supplies ordered \par Weight loss discussed\par I stressed the need maintain compliance  with the PAP device.\par The patient is not to use an Ozone or UV sterilizer. \par F/U in 12 months\par \par  5

## 2024-01-02 NOTE — ED ADULT TRIAGE NOTE - CHIEF COMPLAINT QUOTE
Pt endorsing facial pain and swelling x2 months. States "I feel my heartbeat in my ear from the pain and I can't sleep". States he has had multiple dental infections in the past and needed abx. Pt. tearful and very anxious in triage.

## 2024-01-02 NOTE — ED ADULT TRIAGE NOTE - STATUS:
Applied Colchicine Counseling:  Patient counseled regarding adverse effects including but not limited to stomach upset (nausea, vomiting, stomach pain, or diarrhea).  Patient instructed to limit alcohol consumption while taking this medication.  Colchicine may reduce blood counts especially with prolonged use.  The patient understands that monitoring of kidney function and blood counts may be required, especially at baseline. The patient verbalized understanding of the proper use and possible adverse effects of colchicine.  All of the patient's questions and concerns were addressed.

## 2024-01-02 NOTE — ED PROVIDER NOTE - CARE PLAN
1 Principal Discharge DX:	Encounter for medical assessment   Principal Discharge DX:	Facial discomfort

## 2024-01-02 NOTE — ED PROVIDER NOTE - NSFOLLOWUPCLINICS_GEN_ALL_ED_FT
[facility]  Dental  [address] [place] [address]  Phone: [phone]  Fax:     [facility] [place] [address]  Phone: [phone]  Fax:      [facility]  Dental  [address] [place] [address]  Phone: [phone]  Fax:     [facility]  [place] [address]  Phone: [phone]  Fax:

## 2024-01-02 NOTE — ED PROVIDER NOTE - PHYSICAL EXAMINATION
Gen: Nontoxic, well appearing, in NAD.  Skin: Warm and dry as visualized. No facial swelling.   Head: NC/AT.  Eyes: PERRLA. EOMI.  Ears: No external canal edema or erythema. TM pearly grey, b/l.   Mouth/Throat: Airway patent. Tolerating secretions, no drooling or trismus. Tonsils and pharynx without erythema or exudates. Tonsils not enlarged. No abscess. No Franklin Angina.   Neck: Supple, FROM. Trachea midline.   Resp: No distress.  Cardio: Well perfused.  Ext: No deformities. MAEx4. FROM.   Neuro: A&Ox3. Appears nonfocal.   Psych: Normal affect and mood.

## 2024-01-03 ENCOUNTER — APPOINTMENT (OUTPATIENT)
Dept: GASTROENTEROLOGY | Facility: CLINIC | Age: 24
End: 2024-01-03
Payer: MEDICAID

## 2024-01-03 VITALS
WEIGHT: 194 LBS | HEIGHT: 71 IN | DIASTOLIC BLOOD PRESSURE: 88 MMHG | SYSTOLIC BLOOD PRESSURE: 137 MMHG | HEART RATE: 124 BPM | BODY MASS INDEX: 27.16 KG/M2 | TEMPERATURE: 97.9 F

## 2024-01-03 DIAGNOSIS — R00.2 PALPITATIONS: ICD-10-CM

## 2024-01-03 DIAGNOSIS — H93.19 TINNITUS, UNSPECIFIED EAR: ICD-10-CM

## 2024-01-03 PROCEDURE — 99213 OFFICE O/P EST LOW 20 MIN: CPT

## 2024-01-03 NOTE — PHYSICAL EXAM
[Alert] : alert [Normal Voice/Communication] : normal voice/communication [Healthy Appearing] : healthy appearing [No Acute Distress] : no acute distress [Sclera] : the sclera and conjunctiva were normal [Hearing Threshold Finger Rub Not [place]] : hearing was normal [Normal Lips/Gums] : the lips and gums were normal [Normal Appearance] : the appearance of the neck was normal [No Neck Mass] : no neck mass was observed [No Respiratory Distress] : no respiratory distress [No Acc Muscle Use] : no accessory muscle use [Respiration, Rhythm And Depth] : normal respiratory rhythm and effort [Auscultation Breath Sounds / Voice Sounds] : lungs were clear to auscultation bilaterally [Normal S1, S2] : normal S1 and S2 [Bowel Sounds] : normal bowel sounds [Abdomen Tenderness] : non-tender [No Masses] : no abdominal mass palpated [Abdomen Soft] : soft [] : no hepatosplenomegaly [Oriented To Time, Place, And Person] : oriented to person, place, and time [Tachycardic ___] : the heart rate was tachycardic at [unfilled] bpm [Regular] : the rhythm was regular

## 2024-01-03 NOTE — REASON FOR VISIT
[Follow-up] : a follow-up of an existing diagnosis [FreeTextEntry1] : epigastric pain, change in BM

## 2024-01-03 NOTE — ASSESSMENT
[FreeTextEntry1] : Patient is a 23 year old male who presents for follow up visit. Pt is concerned about left ear pulsating and increased heart rate from PPI use? Recommended weaning off PPI at this time, can try QOD x 2 weeks then q3d if tolerating. Pt is concerned about h.pylori. Will order UBT after d/c PPI x 2 weeks. PT has appt scheduled in April 2024 with Dr. Crocker, important to keep that appt. (Pt had an appt with Dr. Crocker yesterday but was a no show and presented today for urgent, walk in appt). Pt states if UBT is negative, he would like an EGD bc he knows someone who had negative UBT with active h.pylori infection. I advised him we will take it one step at a time and these concerns can be addressed by Dr. Crocker at his upcoming appt. PT can f/u with PCP or cardiologist about tachycardia, he is anxious in office today.

## 2024-01-03 NOTE — HISTORY OF PRESENT ILLNESS
[FreeTextEntry1] : Patient is a 23 year old male who presents for follow up visit. Pt was last seen about 1 month ago by Dr. Crocker, was having loose stools and states he felt a "suffocating pressure in esophagus" which has improved while on PPI. He presents today concerned that symptoms he is feeling is a side effect of the PPI. He states his heart rate goes up whenever he takes the PPI and feels a pulsating sensation in his left ear only - he has been having problems with his wisdom teeth the last few months. Was on Oxycodone and Ibuprofen for wisdom teeth, prescribed to him by multiple ERs. He was on antibiotics as well, was having diarrhea which resolved.   He was advised to take PPI, antibiotics and florastor by Dr. Crocker last month. His BMs are normal now but states about 2 weeks into taking the PPI, he saw a "red bean" in his stool x 2 that he thought was blood, did not recur. Has not occurred in a few weeks.   Pt had labs and CT a/p in ER in December which was normal.   Patient denies any significant cardiac or pulmonary conditions.   Patient denies dysphagia, nausea, vomiting, or unexplained weight loss. [de-identified] : CT: ACC: [id number] EXAM: CT ABDOMEN AND PELVIS IC ORDERED BY: HE SANCHEZ  PROCEDURE DATE: 12/03/2023    INTERPRETATION: CLINICAL INFORMATION: Abdominal pain.  COMPARISON: None.  CONTRAST/COMPLICATIONS: IV Contrast: Omnipaque 350 90 cc administered 10 cc discarded Oral Contrast: NONE Complications: None reported at time of study completion  PROCEDURE: CT of the Abdomen and Pelvis was performed. Sagittal and coronal reformats were performed.  FINDINGS: LOWER CHEST: Within normal limits.  LIVER: Mild hepatic steatosis. BILE DUCTS: Normal caliber. GALLBLADDER: Contracted. SPLEEN: Within normal limits. PANCREAS: Within normal limits. ADRENALS: Within normal limits. KIDNEYS/URETERS: Within normal limits.  BLADDER: Within normal limits. REPRODUCTIVE ORGANS: Prostate gland is not enlarged.  BOWEL: No bowel obstruction or overt bowel wall thickening. Appendix is normal. PERITONEUM: No ascites, pneumoperitoneum, or loculated collection. No mesenteric lymphadenopathy. VESSELS: Within normal limits. RETROPERITONEUM/LYMPH NODES: No lymphadenopathy. ABDOMINAL WALL: Within normal limits. BONES: Within normal limits.  IMPRESSION: No acute findings on CT the abdomen and pelvis explain patient's abdominal pain.    --- End of Report ---      RANJIT BENNETT DO; Attending Radiologist This document has been electronically signed. Dec 3 2023 3:22AM.

## 2024-01-08 ENCOUNTER — TRANSCRIPTION ENCOUNTER (OUTPATIENT)
Age: 24
End: 2024-01-08

## 2024-02-02 ENCOUNTER — EMERGENCY (EMERGENCY)
Facility: HOSPITAL | Age: 24
LOS: 1 days | End: 2024-02-02
Attending: EMERGENCY MEDICINE
Payer: COMMERCIAL

## 2024-02-02 VITALS
WEIGHT: 179.9 LBS | HEART RATE: 145 BPM | HEIGHT: 71 IN | TEMPERATURE: 98 F | SYSTOLIC BLOOD PRESSURE: 126 MMHG | RESPIRATION RATE: 20 BRPM | OXYGEN SATURATION: 98 % | DIASTOLIC BLOOD PRESSURE: 76 MMHG

## 2024-02-02 VITALS
TEMPERATURE: 99 F | SYSTOLIC BLOOD PRESSURE: 130 MMHG | RESPIRATION RATE: 16 BRPM | HEART RATE: 91 BPM | DIASTOLIC BLOOD PRESSURE: 72 MMHG | OXYGEN SATURATION: 96 %

## 2024-02-02 LAB
ALBUMIN SERPL ELPH-MCNC: 4.8 G/DL — SIGNIFICANT CHANGE UP (ref 3.3–5.2)
ALP SERPL-CCNC: 122 U/L — HIGH (ref 40–120)
ALT FLD-CCNC: 137 U/L — HIGH
ANION GAP SERPL CALC-SCNC: 17 MMOL/L — SIGNIFICANT CHANGE UP (ref 5–17)
APTT BLD: 33.4 SEC — SIGNIFICANT CHANGE UP (ref 24.5–35.6)
AST SERPL-CCNC: 52 U/L — HIGH
BASOPHILS # BLD AUTO: 0.01 K/UL — SIGNIFICANT CHANGE UP (ref 0–0.2)
BASOPHILS NFR BLD AUTO: 0.1 % — SIGNIFICANT CHANGE UP (ref 0–2)
BILIRUB SERPL-MCNC: 0.4 MG/DL — SIGNIFICANT CHANGE UP (ref 0.4–2)
BUN SERPL-MCNC: 6.4 MG/DL — LOW (ref 8–20)
CALCIUM SERPL-MCNC: 9.8 MG/DL — SIGNIFICANT CHANGE UP (ref 8.4–10.5)
CHLORIDE SERPL-SCNC: 98 MMOL/L — SIGNIFICANT CHANGE UP (ref 96–108)
CO2 SERPL-SCNC: 24 MMOL/L — SIGNIFICANT CHANGE UP (ref 22–29)
CREAT SERPL-MCNC: 0.73 MG/DL — SIGNIFICANT CHANGE UP (ref 0.5–1.3)
EGFR: 130 ML/MIN/1.73M2 — SIGNIFICANT CHANGE UP
EOSINOPHIL # BLD AUTO: 0.01 K/UL — SIGNIFICANT CHANGE UP (ref 0–0.5)
EOSINOPHIL NFR BLD AUTO: 0.1 % — SIGNIFICANT CHANGE UP (ref 0–6)
GLUCOSE SERPL-MCNC: 112 MG/DL — HIGH (ref 70–99)
HCT VFR BLD CALC: 45.2 % — SIGNIFICANT CHANGE UP (ref 39–50)
HGB BLD-MCNC: 16 G/DL — SIGNIFICANT CHANGE UP (ref 13–17)
HIV 1 & 2 AB SERPL IA.RAPID: SIGNIFICANT CHANGE UP
IMM GRANULOCYTES NFR BLD AUTO: 0.3 % — SIGNIFICANT CHANGE UP (ref 0–0.9)
INR BLD: 1.16 RATIO — SIGNIFICANT CHANGE UP (ref 0.85–1.18)
LYMPHOCYTES # BLD AUTO: 1.15 K/UL — SIGNIFICANT CHANGE UP (ref 1–3.3)
LYMPHOCYTES # BLD AUTO: 10.9 % — LOW (ref 13–44)
MCHC RBC-ENTMCNC: 30 PG — SIGNIFICANT CHANGE UP (ref 27–34)
MCHC RBC-ENTMCNC: 35.4 GM/DL — SIGNIFICANT CHANGE UP (ref 32–36)
MCV RBC AUTO: 84.8 FL — SIGNIFICANT CHANGE UP (ref 80–100)
MONOCYTES # BLD AUTO: 0.57 K/UL — SIGNIFICANT CHANGE UP (ref 0–0.9)
MONOCYTES NFR BLD AUTO: 5.4 % — SIGNIFICANT CHANGE UP (ref 2–14)
NEUTROPHILS # BLD AUTO: 8.8 K/UL — HIGH (ref 1.8–7.4)
NEUTROPHILS NFR BLD AUTO: 83.2 % — HIGH (ref 43–77)
PLATELET # BLD AUTO: 359 K/UL — SIGNIFICANT CHANGE UP (ref 150–400)
POTASSIUM SERPL-MCNC: 3.8 MMOL/L — SIGNIFICANT CHANGE UP (ref 3.5–5.3)
POTASSIUM SERPL-SCNC: 3.8 MMOL/L — SIGNIFICANT CHANGE UP (ref 3.5–5.3)
PROT SERPL-MCNC: 7.9 G/DL — SIGNIFICANT CHANGE UP (ref 6.6–8.7)
PROTHROM AB SERPL-ACNC: 12.8 SEC — SIGNIFICANT CHANGE UP (ref 9.5–13)
RBC # BLD: 5.33 M/UL — SIGNIFICANT CHANGE UP (ref 4.2–5.8)
RBC # FLD: 12.4 % — SIGNIFICANT CHANGE UP (ref 10.3–14.5)
SODIUM SERPL-SCNC: 139 MMOL/L — SIGNIFICANT CHANGE UP (ref 135–145)
WBC # BLD: 10.57 K/UL — HIGH (ref 3.8–10.5)
WBC # FLD AUTO: 10.57 K/UL — HIGH (ref 3.8–10.5)

## 2024-02-02 PROCEDURE — 80053 COMPREHEN METABOLIC PANEL: CPT

## 2024-02-02 PROCEDURE — 93010 ELECTROCARDIOGRAM REPORT: CPT

## 2024-02-02 PROCEDURE — G1004: CPT

## 2024-02-02 PROCEDURE — 86703 HIV-1/HIV-2 1 RESULT ANTBDY: CPT

## 2024-02-02 PROCEDURE — 70498 CT ANGIOGRAPHY NECK: CPT | Mod: MG

## 2024-02-02 PROCEDURE — 99285 EMERGENCY DEPT VISIT HI MDM: CPT | Mod: 25

## 2024-02-02 PROCEDURE — 36415 COLL VENOUS BLD VENIPUNCTURE: CPT

## 2024-02-02 PROCEDURE — 70496 CT ANGIOGRAPHY HEAD: CPT | Mod: 26,MF

## 2024-02-02 PROCEDURE — 85730 THROMBOPLASTIN TIME PARTIAL: CPT

## 2024-02-02 PROCEDURE — 99285 EMERGENCY DEPT VISIT HI MDM: CPT

## 2024-02-02 PROCEDURE — 70496 CT ANGIOGRAPHY HEAD: CPT | Mod: MF

## 2024-02-02 PROCEDURE — 70450 CT HEAD/BRAIN W/O DYE: CPT | Mod: 26,MF,59

## 2024-02-02 PROCEDURE — 70450 CT HEAD/BRAIN W/O DYE: CPT | Mod: MF

## 2024-02-02 PROCEDURE — 85025 COMPLETE CBC W/AUTO DIFF WBC: CPT

## 2024-02-02 PROCEDURE — 85610 PROTHROMBIN TIME: CPT

## 2024-02-02 PROCEDURE — 93005 ELECTROCARDIOGRAM TRACING: CPT

## 2024-02-02 PROCEDURE — 70498 CT ANGIOGRAPHY NECK: CPT | Mod: 26,MG

## 2024-02-02 NOTE — ED PROVIDER NOTE - CLINICAL SUMMARY MEDICAL DECISION MAKING FREE TEXT BOX
labs and diagnostic imaging results reviewed with patient; neurosurgery assessment noted labs and diagnostic imaging results reviewed with patient; neurosurgery and neuro IR assessments noted; patient feels comfortable with discharge and medical plan; PMD or clinic follow up recommended for reassessment. Patient is aware of signs/symptoms to return to the emergency department.

## 2024-02-02 NOTE — ED ADULT NURSE NOTE - OBJECTIVE STATEMENT
patient is A&Ox4, in NAD. Patient presents to ED c/o headache 2 days after being diagnosed with a carotid aneurysm. States the last 2 weeks he has experienced sudden headaches, dizziness, and loss of balance. Denies medical hx or N/V/d. Patient taken to CT scan on arrival. Pending lab results. Aware of POC.

## 2024-02-02 NOTE — ED PROVIDER NOTE - CARE PROVIDER_API CALL
Neo Santana  Interventional Neuroradiology  [address] [place] [id number]  Phone: [phone]  Fax: [phone]  Follow Up Time:

## 2024-02-02 NOTE — CONSULT NOTE ADULT - NSCONSULTADDITIONALINFOA_GEN_ALL_CORE
NSGY Attg:    see above    patient seen and examined by PA staff    imaging reviewed    agree with above

## 2024-02-02 NOTE — ED ADULT NURSE NOTE - NSFALLUNIVINTERV_ED_ALL_ED
Bed/Stretcher in lowest position, wheels locked, appropriate side rails in place/Call bell, personal items and telephone in reach/Instruct patient to call for assistance before getting out of bed/chair/stretcher/Non-slip footwear applied when patient is off stretcher/[place] to call system/Physically safe environment - no spills, clutter or unnecessary equipment/Purposeful proactive rounding/Room/bathroom lighting operational, light cord in reach

## 2024-02-02 NOTE — ED ADULT TRIAGE NOTE - CHIEF COMPLAINT QUOTE
Pt states he has been diagnosed two days with L sided 4 mm carotid aneurysm - now c/o 8/10 headache.

## 2024-02-02 NOTE — ED ADULT TRIAGE NOTE - AS TEMP SITE
-- DO NOT REPLY / DO NOT REPLY ALL --  -- Message is from the [facility]--    Patient is requesting a medication refill - medication is on active medication list    Patient is currently OUT of the requested medication.    Was Medication Pended?  Yes.    Rx Name and Dose:  Metformin-1000 MG Tablet    Duration: 30 days    Pharmacy  [facility] #[id number] [place] [address] [address] [facility]    Patient confirmed the above pharmacy as correct?  Yes    Caller Information       Type Contact Phone    03/08/2021 04:03 PM CST Phone (Incoming) [facility] #[id number] [facility] [address] AT [facility] (Pharmacy) [phone]          Alternative phone number: N/A    Turnaround time given to caller:   \"This message will be sent to [state Provider's name]. The clinical team will fulfill your request as soon as they review your message.\"  
Waiting for a call back/response  
oral

## 2024-02-02 NOTE — CONSULT NOTE ADULT - SUBJECTIVE AND OBJECTIVE BOX
HISTORY OF PRESENT ILLNESS:   23-year-old male with PMH non-alcoholic fatty liver disease, PSHx of recent b/l upper wisdom tooth extraction presets to ED complaining of jaw pain, occasional headache, dizziness, visual floaters and tinnitus intermittently since November. Patient has followed up with many specialists including an audiologist, ophthalmologist, ENT and dentist all of which were negative except for b/l lower wisdom tooth impaction which he has a scheduled surgery for in the next few weeks. 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 He is very anxious regarding this new diagnosis of aneurysm on the outpatient MRI and states he is worried he will need surgery. Patient denies any current headache, dizziness, N/V, vision changes, or neck pain. Patient states the pain located in his L jaw and similar to the type of pain he had when he needed to have his top wisdom teeth out the first time. 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 Neurosurgery was consulted for incidental aneurysm finding.    PAST MEDICAL & SURGICAL HISTORY:  Gastritis  Non-alcoholic fatty liver    SOCIAL HISTORY:  Tobacco Use: Denies  EtOH use: Denies  Substance: Denies    No Known Allergies    REVIEW OF SYSTEMS  Negative except as noted in HPI    HOME MEDICATIONS:  Vitamin D  Ibuprofen  Tylenol  Oxycodone for tooth pain    Vital Signs Last 24 Hrs  T(C): 36.9 (02 Feb 2024 15:25), Max: 36.9 (02 Feb 2024 15:25)  T(F): 98.4 (02 Feb 2024 15:25), Max: 98.4 (02 Feb 2024 15:25)  HR: 119 (02 Feb 2024 15:25) (119 - 145)  BP: 117/71 (02 Feb 2024 15:25) (117/71 - 126/76)  RR: 20 (02 Feb 2024 15:25) (20 - 20)  SpO2: 96% (02 Feb 2024 15:25) (96% - 98%)  Parameters below as of 02 Feb 2024 15:25  Patient On (Oxygen Delivery Method): room air    PHYSICAL EXAM:  GENERAL: Anxious-appearing young male  HEAD: Atraumatic, normocephalic  EYES: Conjunctiva and sclera clear  LUCITA COMA SCORE: E-4 V-5 M-6 = 15  MENTAL STATUS: AAOx3; Awake; Opens eyes spontaneously; Appropriately conversant without aphasia; following simple commands  CRANIAL NERVES: PERRL. EOMI without nystagmus. Facial sensation intact V1-3 distribution b/l. Face symmetric w/ normal eye closure and smile, tongue midline. Hearing grossly intact. Speech clear. Head turning and shoulder shrug intact.   MOTOR: Strength 5/5 b/l upper and lower extremities; no drift  SENSATION: Grossly intact to light touch all extremities  CHEST/LUNG: Non-labored breathing on room-air  SKIN: Warm, dry    LABS:                        16.0   10.57 )-----------( 359      ( 02 Feb 2024 14:45 )             45.2     02-02    139  |  98  |  6.4<L>  ----------------------------<  112<H>  3.8   |  24.0  |  0.73    Ca    9.8      02 Feb 2024 14:45    TPro  7.9  /  Alb  4.8  /  TBili  0.4  /  DBili  x   /  AST  52<H>  /  ALT  137<H>  /  AlkPhos  122<H>  02-02    PT/INR - ( 02 Feb 2024 14:45 )   PT: 12.8 sec;   INR: 1.16 ratio    PTT - ( 02 Feb 2024 14:45 )  PTT:33.4 sec    Urinalysis Basic - ( 02 Feb 2024 14:45 )  Color: x / Appearance: x / SG: x / pH: x  Gluc: 112 mg/dL / Ketone: x  / Bili: x / Urobili: x   Blood: x / Protein: x / Nitrite: x   Leuk Esterase: x / RBC: x / WBC x   Sq Epi: x / Non Sq Epi: x / Bacteria: x    RADIOLOGY & ADDITIONAL STUDIES:    CT Angio Neck w/ IV Cont (02.02.24 @ 14:27)  IMPRESSION:  HEAD CT: No evidence of an acute intracranial hemorrhage midline shift or hydrocephalus.    NECK CTA: No hemodynamic significant narrowing within the neck.    BRAIN CTA: 2 to 3 mm left ophthalmic region aneurysm pointing superiorly.   Probable infundibular origin of the right ophthalmic artery.   No proximal large vessel occlusion.  Recommend neurovascular follow-up        
HPI:    23-year-old male with PMH non-alcoholic fatty liver disease, PSHx of recent b/l upper wisdom tooth extraction presets to ED complaining of jaw pain, occasional headache, dizziness, visual floaters and tinnitus intermittently since November. Patient has followed up with many specialists including an audiologist, ophthalmologist, ENT and dentist all of which were negative except for b/l lower wisdom tooth impaction which he has a scheduled surgery for in the next few months. 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 He is very anxious regarding this new diagnosis of aneurysm on the outpatient MRI and states he is worried he will need surgery. Patient denies any current headache, dizziness, N/V, vision changes, or neck pain. Patient states the pain located in his L jaw and similar to the type of pain he had when he needed to have his top wisdom teeth out the first time. 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 Interventional Neurology was consulted for incidental aneurysm finding.      PAST MEDICAL & SURGICAL HISTORY:  No significant past surgical history          MEDICATIONS (HOME):  Home Medications:    MEDICATIONS  (STANDING):    MEDICATIONS  (PRN):    ALLERGIES/INTOLERANCES:  Allergies  No Known Allergies    Intolerances    VITALS & EXAMINATION:  Vital Signs Last 24 Hrs  T(C): 37 (02 Feb 2024 18:20), Max: 37 (02 Feb 2024 18:20)  T(F): 98.6 (02 Feb 2024 18:20), Max: 98.6 (02 Feb 2024 18:20)  HR: 91 (02 Feb 2024 18:20) (91 - 145)  BP: 130/72 (02 Feb 2024 18:20) (117/71 - 130/72)  BP(mean): --  RR: 16 (02 Feb 2024 18:20) (16 - 20)  SpO2: 96% (02 Feb 2024 18:20) (96% - 98%)    Parameters below as of 02 Feb 2024 18:20  Patient On (Oxygen Delivery Method): room air        General:  Constitutional: Obese Male, appears stated age, in no apparent distress including pain  Head: Normocephalic & atraumatic.  ENT: Patent ear canals, intact TM, mucus membranes moist & pink, neck supple, no lymphadenopathy.   Respiratory: Patent airway. All lung fields are clear to auscultation bilaterally.  Extremities: No cyanosis, clubbing, or edema.  Skin: No rashes, bruising, or discoloration.    Cardiovascular (>2): RRR no murmurs. Carotid pulsations symmetric, no bruits. Normal capillary beds refill, 1-2 seconds or less.     Neurological (>12):  MS: Awake, alert, oriented to person, place, situation, time. Normal affect. Follows all commands.    Language: Speech is clear, fluent with good repetition & comprehension     CNs: PERRLA (R = 3mm, L = 3mm). VFF. EOMI no nystagmus, no diplopia. V1-3 intact to LT/pinprick, well developed masseter muscles b/l. No facial asymmetry b/l, full eye closure strength b/l. Tongue midline    Motor: Normal muscle bulk & tone. No noticeable tremor or seizure. No pronator drift.      Sensation: Intact to LT/PP b/l throughout.     Cortical: Extinction on DSS (neglect): none      Coordination: intact rapid-alt movements. No dysmetria to FTN      LABORATORY:  CBC                       16.0   10.57 )-----------( 359      ( 02 Feb 2024 14:45 )             45.2     Chem 02-02    139  |  98  |  6.4<L>  ----------------------------<  112<H>  3.8   |  24.0  |  0.73    Ca    9.8      02 Feb 2024 14:45    TPro  7.9  /  Alb  4.8  /  TBili  0.4  /  DBili  x   /  AST  52<H>  /  ALT  137<H>  /  AlkPhos  122<H>  02-02    LFTs LIVER FUNCTIONS - ( 02 Feb 2024 14:45 )  Alb: 4.8 g/dL / Pro: 7.9 g/dL / ALK PHOS: 122 U/L / ALT: 137 U/L / AST: 52 U/L / GGT: x           Coagulopathy PT/INR - ( 02 Feb 2024 14:45 )   PT: 12.8 sec;   INR: 1.16 ratio         PTT - ( 02 Feb 2024 14:45 )  PTT:33.4 sec  Lipid Panel   A1c   Cardiac enzymes     U/A Urinalysis Basic - ( 02 Feb 2024 14:45 )    Color: x / Appearance: x / SG: x / pH: x  Gluc: 112 mg/dL / Ketone: x  / Bili: x / Urobili: x   Blood: x / Protein: x / Nitrite: x   Leuk Esterase: x / RBC: x / WBC x   Sq Epi: x / Non Sq Epi: x / Bacteria: x      Radiology (XR, CT, MR, U/S, TTE/JAIME):    CT Angio Head w/ IV Cont (02.02.24 @ 14:26) >    FINDINGS:  HEMORRHAGE:  No evidence of intracranial hemorrhage.  BRAIN PARENCHYMA:  No abnormal regions of parenchymal attenuation.  No   mass or mass effect.  VENTRICLES / SHIFT:  No hydrocephalus. No midline shift.  EXTRA-AXIAL / BASAL CISTERNS:  No extra-axial mass. Basal cisterns   preserved.  CALVARIUM AND EXTRACRANIAL SOFT TISSUES:  No depressed calvarial fracture.  SINUSES, ORBITS, MASTOIDS:  The visualized paranasal sinuses and mastoid   air cells are well aerated.  ----------------------------------------    CTA TECHNIQUE:  CT angiography of the neck and brain was performed during the dynamic   administration of intravenous contrast.  MIP reconstructions were   performed and reviewed. Multiplanar reformations were obtained.  Contrast administered: 90 cc Omipaque 350.  Contrast discarded: 10 cc.    CTA FINDINGS:  NECK  RIGHT CAROTID CIRCULATION:  Evaluation of the right carotid circulation demonstrates no hemodynamic   significant narrowing utilizing a distal reference.  LEFT CAROTID CIRCULATION:  Evaluation of the left carotid circulation demonstrates no hemodynamic   significant narrowing utilizing a distal reference.  VERTEBRAL ARTERIES:  Evaluation of the vertebral arteries reveals no evidence of a vertebral   artery occlusion or dissection. Mild dominance of the right vertebral   artery    BRAIN  ANTERIOR CIRCULATION:  Distal internal carotid arteries are patent.  There is a 2 to 3 mm left   ophthalmic region aneurysm pointing superiorly. Probable infundibular   origin of the right ophthalmic artery.  Anterior cerebral arteries are patent.  Middle cerebral arteries are patent.  POSTERIOR CIRCULATION:  Distal vertebral arteries are patent.  Distal right vertebral artery is   dominant.  Basilar artery is patent.  Posterior cerebral arteries are patent.  Small right posterior   communicating artery.    IMPRESSION:  HEAD CT: No evidence of an acute intracranial hemorhage, midline shift or   hydrocephalus.  NECK CTA: No hemodynamic significant narowing within the neck.  BRAIN CTA: 2 to 3 mm left ophthalmic region aneurysm pointing superiorly.   Probable infundibular origin of the right ophthalmic artery. No proximal   large vessel occlusion. Recommend neurovascular follow-up

## 2024-02-02 NOTE — CONSULT NOTE ADULT - ASSESSMENT
23M with PMH gastritis and non-alcoholic fatty liver disease complaining of dizziness, tinnitus visual floaters, jaw pain concerned for recent incidental finding of 2-3mm L opthalmic artery aneurysm on outpatient MRI    Plan:  - All imaging reviewed- CTH negative for hemorrhage, CTA shows 2-3mm left opthalmic artery aneurysm  - No acute neurosurgical indications; low clinical suspicion for aneurysm rupture as patient denies any current headache, N/V, dizziness  - Recommend Neuro IR consult for further evaluation, and for follow-up of incidental aneurysm   - Pain control for jaw/tooth pain  - Further medical/supportive management per primary team  - Discussed with Dr. Campos
23M with PMH gastritis and non-alcoholic fatty liver disease complaining of dizziness, tinnitus visual floaters, jaw pain likely related to bilateral wisdom tooth impaction and concerned for recent incidental finding of 2-3mm L opthalmic artery aneurysm on outpatient MRI and redemonstrated on CTH/ CTA Head and Neck here in ED.     Plan:  - All imaging reviewed- CTH negative for hemorrhage, CTA shows 2-3mm left opthalmic artery aneurysm  - No acute neuro-intervention warranted at this time, aneurysm is not symptomatic and remains an incidental finding  - Follow up with Dr Santana in outpatient setting within 1 wek  - Discussed with Dr. Santana

## 2024-02-02 NOTE — ED PROVIDER NOTE - PATIENT PORTAL LINK FT
You can access the FollowMyHealth Patient Portal offered by [facility] by registering at the following website: [email or web address]. By joining [employer]’s FollowMyHealth portal, you will also be able to view your health information using other applications (apps) compatible with our system.

## 2024-02-09 ENCOUNTER — APPOINTMENT (OUTPATIENT)
Dept: NEUROLOGY | Facility: CLINIC | Age: 24
End: 2024-02-09
Payer: MEDICAID

## 2024-02-09 VITALS
DIASTOLIC BLOOD PRESSURE: 83 MMHG | HEIGHT: 71 IN | SYSTOLIC BLOOD PRESSURE: 140 MMHG | BODY MASS INDEX: 25.2 KG/M2 | WEIGHT: 180 LBS | HEART RATE: 102 BPM | OXYGEN SATURATION: 98 %

## 2024-02-09 DIAGNOSIS — K62.5 HEMORRHAGE OF ANUS AND RECTUM: ICD-10-CM

## 2024-02-09 DIAGNOSIS — R00.0 TACHYCARDIA, UNSPECIFIED: ICD-10-CM

## 2024-02-09 PROCEDURE — 99215 OFFICE O/P EST HI 40 MIN: CPT

## 2024-02-09 PROCEDURE — G2211 COMPLEX E/M VISIT ADD ON: CPT | Mod: NC,1L

## 2024-02-09 RX ORDER — SACCHAROMYCES BOULARDII 50 MG
250 CAPSULE ORAL TWICE DAILY
Qty: 60 | Refills: 3 | Status: DISCONTINUED | COMMUNITY
Start: 2023-12-06 | End: 2024-02-09

## 2024-02-14 NOTE — DISCUSSION/SUMMARY
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   I have personally reviewed available neuroradiological images. We discussed the risks of having an aneurysm and the chance of an aneurysm causing a SAH. We discussed at length about the low, but real risk of potentially fatal or disabling aneurysmal rupture in his lifetime. We also discussed endovascular, open surgical and conservative management strategies.   He has frequent severe headaches which are felt to be elicited by his lower wisdom teeth that are causing significant pain, swelling of the mandible requiring frequent antibiotics and lots of pain medicines and hospital visits.   He is very anxious that his dental situation may worsening or potentially cause his aneurysm to rupture. We discussed that severe pain elevated blood pressure may potentially increase a risk of aneurysm rupture and I recommended that the patient have his wisdom teeth removed as soon as possible.   There is no neurological contraindication for his wisdom teeth extraction and I feel that it is medically necessary to have this extraction as soon as possible so as not to increase his risk of aneurysmal rupture.    He would like to consider treating his aneurysm given his severe anxiety and fear of its rupture risk, but would like to complete dental extraction first.   I recommended a diagnostic cerebral angiogram to start as imaging (CTA head) is suboptimal and complete evaluation is recommended.   The goals, benefits, alternatives, and risks of diagnostic cerebral angiography were discussed with the patient. Risks including, but not limited to, stroke, dissection, groin hematoma, and contrast reaction were discussed with the patient. He is in agreement and conveys good understanding.   His dental procedure is currently scheduled for the end of March for his bilateral lower wisdom tooth impaction. I recommend that he gets this done promptly to prevent delaying treatment of his cerebral aneurysm or further aggravation with his chronic and significant pain.    PLAN: 1. Strict blood pressure control 2. Follow up after his dental procedure to plan for possible diagnostic cerebral angiogram   All of the patient's questions and concerns were addressed.

## 2024-02-14 NOTE — PHYSICAL EXAM
[FreeTextEntry1] :  GENERAL APPEARANCE: Well developed, well-nourished man in no acute distress.  NEUROLOGIC EXAM:  MENTAL STATUS: Alert and Oriented to person, place and time. Speech is fluent, without aphasia or dysarthria Behavior and affect appropriate to situation.                         CRANIAL NERVES: CN 2:    Visual fields are full to confrontation. CN 3, 4, 6: Extraocular movements are intact. No nystagmus or ophthalmoplegia is evident. Pupils are equally round and reactive to light. CN 5:     Facial sensation is intact to light touch in all 3 divisions CN 7:     Facial excursion is full and symmetric bilaterally.  MOTOR: Strength is 5/5 throughout for age and stature. No orbiting or drift noted.  SENSORY: Intact to light touch and perception in all four extremities without extinction to double simultaneous stimulation  COORD: Finger to nose testing without dysmetria bilaterally.  GAIT: Normal station and gait.

## 2024-02-14 NOTE — HISTORY OF PRESENT ILLNESS
[FreeTextEntry1] : Edgardo Fonseca, a 24 yo male with history of non-alcoholic fatty liver disease, recent b/l upper wisdom tooth extraction, who presented to [facility] one week ago complaining of jaw pain, occasional headache, dizziness, visual floaters and tinnitus intermittently since November and was found to have an incidental cerebral aneurysm, presents today for hospital follow up. CTA 2/2024 showed a 3 mm left ophthalmic region aneurysm. He was found to have b/l lower wisdom tooth impaction and is pending surgery, scheduled for the end of March. Since hospitalization, he has been having severe headaches. Denies additional neurological symptoms. He has severe anxiety about his aneurysm and is very worried about the aneurysm and it's implication for possible rupture.

## 2024-02-15 NOTE — ED ADULT NURSE NOTE - CAS EDP DISCH TYPE
Last seen in Clinic:  2/13/24   Next appointment is: 3/1/24 U/S  Last procedure: none noted  GI Hx:  Right sided abdominal pain, Chronic constipation, Gastroesophageal reflux disease     Writer returned the patient's call regarding Miralax and Gatorade mix. Patient stated its not working because she is still have right sided 8/10 cramping pain. Patient stated she took OTC Tylenol ES 2 tablet. Patient stated she has been taking the mix for 2 days and noted first all liquid  BM, brown in color but today she did have a small solid bowel movement. Patient stated she does not want to repeat the Miralax and Gatorade mix due to the cost.    Wendy any suggestion you can advise for patient?   Home

## 2024-03-24 ENCOUNTER — EMERGENCY (EMERGENCY)
Facility: HOSPITAL | Age: 24
LOS: 1 days | Discharge: HOME | End: 2024-03-24
Attending: STUDENT IN AN ORGANIZED HEALTH CARE EDUCATION/TRAINING PROGRAM
Payer: COMMERCIAL

## 2024-03-24 VITALS
OXYGEN SATURATION: 99 % | HEART RATE: 150 BPM | TEMPERATURE: 98 F | DIASTOLIC BLOOD PRESSURE: 81 MMHG | SYSTOLIC BLOOD PRESSURE: 137 MMHG | HEIGHT: 71 IN | WEIGHT: 179.9 LBS | RESPIRATION RATE: 18 BRPM

## 2024-03-24 LAB
ALBUMIN SERPL ELPH-MCNC: 4.7 G/DL — SIGNIFICANT CHANGE UP (ref 3.3–5.2)
ALP SERPL-CCNC: 134 U/L — HIGH (ref 40–120)
ALT FLD-CCNC: 94 U/L — HIGH
ANION GAP SERPL CALC-SCNC: 15 MMOL/L — SIGNIFICANT CHANGE UP (ref 5–17)
AST SERPL-CCNC: 45 U/L — HIGH
BASE EXCESS BLDV CALC-SCNC: 1.7 MMOL/L — SIGNIFICANT CHANGE UP (ref -2–3)
BASE EXCESS BLDV CALC-SCNC: 2.3 MMOL/L — SIGNIFICANT CHANGE UP (ref -2–3)
BASOPHILS # BLD AUTO: 0.02 K/UL — SIGNIFICANT CHANGE UP (ref 0–0.2)
BASOPHILS NFR BLD AUTO: 0.2 % — SIGNIFICANT CHANGE UP (ref 0–2)
BILIRUB SERPL-MCNC: 0.3 MG/DL — LOW (ref 0.4–2)
BUN SERPL-MCNC: 6.8 MG/DL — LOW (ref 8–20)
CA-I SERPL-SCNC: 1.19 MMOL/L — SIGNIFICANT CHANGE UP (ref 1.15–1.33)
CA-I SERPL-SCNC: 1.23 MMOL/L — SIGNIFICANT CHANGE UP (ref 1.15–1.33)
CALCIUM SERPL-MCNC: 9.7 MG/DL — SIGNIFICANT CHANGE UP (ref 8.4–10.5)
CHLORIDE BLDV-SCNC: 101 MMOL/L — SIGNIFICANT CHANGE UP (ref 96–108)
CHLORIDE BLDV-SCNC: 105 MMOL/L — SIGNIFICANT CHANGE UP (ref 96–108)
CHLORIDE SERPL-SCNC: 100 MMOL/L — SIGNIFICANT CHANGE UP (ref 96–108)
CO2 SERPL-SCNC: 25 MMOL/L — SIGNIFICANT CHANGE UP (ref 22–29)
CREAT SERPL-MCNC: 0.7 MG/DL — SIGNIFICANT CHANGE UP (ref 0.5–1.3)
EGFR: 132 ML/MIN/1.73M2 — SIGNIFICANT CHANGE UP
EOSINOPHIL # BLD AUTO: 0.06 K/UL — SIGNIFICANT CHANGE UP (ref 0–0.5)
EOSINOPHIL NFR BLD AUTO: 0.6 % — SIGNIFICANT CHANGE UP (ref 0–6)
GAS PNL BLDV: 137 MMOL/L — SIGNIFICANT CHANGE UP (ref 136–145)
GAS PNL BLDV: 139 MMOL/L — SIGNIFICANT CHANGE UP (ref 136–145)
GAS PNL BLDV: SIGNIFICANT CHANGE UP
GAS PNL BLDV: SIGNIFICANT CHANGE UP
GLUCOSE BLDV-MCNC: 107 MG/DL — HIGH (ref 70–99)
GLUCOSE BLDV-MCNC: 87 MG/DL — SIGNIFICANT CHANGE UP (ref 70–99)
GLUCOSE SERPL-MCNC: 101 MG/DL — HIGH (ref 70–99)
HCO3 BLDV-SCNC: 28 MMOL/L — SIGNIFICANT CHANGE UP (ref 22–29)
HCO3 BLDV-SCNC: 28 MMOL/L — SIGNIFICANT CHANGE UP (ref 22–29)
HCT VFR BLD CALC: 43.9 % — SIGNIFICANT CHANGE UP (ref 39–50)
HCT VFR BLDA CALC: 40 % — SIGNIFICANT CHANGE UP
HCT VFR BLDA CALC: 46 % — SIGNIFICANT CHANGE UP
HGB BLD CALC-MCNC: 13.3 G/DL — SIGNIFICANT CHANGE UP (ref 12.6–17.4)
HGB BLD CALC-MCNC: 15.4 G/DL — SIGNIFICANT CHANGE UP (ref 12.6–17.4)
HGB BLD-MCNC: 15.2 G/DL — SIGNIFICANT CHANGE UP (ref 13–17)
IMM GRANULOCYTES NFR BLD AUTO: 0.4 % — SIGNIFICANT CHANGE UP (ref 0–0.9)
LACTATE BLDV-MCNC: 1.3 MMOL/L — SIGNIFICANT CHANGE UP (ref 0.5–2)
LACTATE BLDV-MCNC: 2.6 MMOL/L — HIGH (ref 0.5–2)
LIDOCAIN IGE QN: 43 U/L — SIGNIFICANT CHANGE UP (ref 22–51)
LYMPHOCYTES # BLD AUTO: 2.51 K/UL — SIGNIFICANT CHANGE UP (ref 1–3.3)
LYMPHOCYTES # BLD AUTO: 23.7 % — SIGNIFICANT CHANGE UP (ref 13–44)
MCHC RBC-ENTMCNC: 29.1 PG — SIGNIFICANT CHANGE UP (ref 27–34)
MCHC RBC-ENTMCNC: 34.6 GM/DL — SIGNIFICANT CHANGE UP (ref 32–36)
MCV RBC AUTO: 84.1 FL — SIGNIFICANT CHANGE UP (ref 80–100)
MONOCYTES # BLD AUTO: 0.75 K/UL — SIGNIFICANT CHANGE UP (ref 0–0.9)
MONOCYTES NFR BLD AUTO: 7.1 % — SIGNIFICANT CHANGE UP (ref 2–14)
NEUTROPHILS # BLD AUTO: 7.23 K/UL — SIGNIFICANT CHANGE UP (ref 1.8–7.4)
NEUTROPHILS NFR BLD AUTO: 68 % — SIGNIFICANT CHANGE UP (ref 43–77)
PCO2 BLDV: 52 MMHG — SIGNIFICANT CHANGE UP (ref 42–55)
PCO2 BLDV: 54 MMHG — SIGNIFICANT CHANGE UP (ref 42–55)
PH BLDV: 7.32 — SIGNIFICANT CHANGE UP (ref 7.32–7.43)
PH BLDV: 7.34 — SIGNIFICANT CHANGE UP (ref 7.32–7.43)
PLATELET # BLD AUTO: 376 K/UL — SIGNIFICANT CHANGE UP (ref 150–400)
PO2 BLDV: 60 MMHG — HIGH (ref 25–45)
PO2 BLDV: 70 MMHG — HIGH (ref 25–45)
POTASSIUM BLDV-SCNC: 3.5 MMOL/L — SIGNIFICANT CHANGE UP (ref 3.5–5.1)
POTASSIUM BLDV-SCNC: 3.6 MMOL/L — SIGNIFICANT CHANGE UP (ref 3.5–5.1)
POTASSIUM SERPL-MCNC: 3.7 MMOL/L — SIGNIFICANT CHANGE UP (ref 3.5–5.3)
POTASSIUM SERPL-SCNC: 3.7 MMOL/L — SIGNIFICANT CHANGE UP (ref 3.5–5.3)
PROT SERPL-MCNC: 7.8 G/DL — SIGNIFICANT CHANGE UP (ref 6.6–8.7)
RBC # BLD: 5.22 M/UL — SIGNIFICANT CHANGE UP (ref 4.2–5.8)
RBC # FLD: 12.1 % — SIGNIFICANT CHANGE UP (ref 10.3–14.5)
SAO2 % BLDV: 91.8 % — SIGNIFICANT CHANGE UP
SAO2 % BLDV: 96.1 % — SIGNIFICANT CHANGE UP
SODIUM SERPL-SCNC: 139 MMOL/L — SIGNIFICANT CHANGE UP (ref 135–145)
WBC # BLD: 10.61 K/UL — HIGH (ref 3.8–10.5)
WBC # FLD AUTO: 10.61 K/UL — HIGH (ref 3.8–10.5)

## 2024-03-24 PROCEDURE — 80053 COMPREHEN METABOLIC PANEL: CPT

## 2024-03-24 PROCEDURE — 99284 EMERGENCY DEPT VISIT MOD MDM: CPT

## 2024-03-24 PROCEDURE — 99284 EMERGENCY DEPT VISIT MOD MDM: CPT | Mod: 25

## 2024-03-24 PROCEDURE — 93010 ELECTROCARDIOGRAM REPORT: CPT

## 2024-03-24 PROCEDURE — 82947 ASSAY GLUCOSE BLOOD QUANT: CPT

## 2024-03-24 PROCEDURE — 82330 ASSAY OF CALCIUM: CPT

## 2024-03-24 PROCEDURE — 84295 ASSAY OF SERUM SODIUM: CPT

## 2024-03-24 PROCEDURE — 82803 BLOOD GASES ANY COMBINATION: CPT

## 2024-03-24 PROCEDURE — 96361 HYDRATE IV INFUSION ADD-ON: CPT

## 2024-03-24 PROCEDURE — 82435 ASSAY OF BLOOD CHLORIDE: CPT

## 2024-03-24 PROCEDURE — 85025 COMPLETE CBC W/AUTO DIFF WBC: CPT

## 2024-03-24 PROCEDURE — 85018 HEMOGLOBIN: CPT

## 2024-03-24 PROCEDURE — 84132 ASSAY OF SERUM POTASSIUM: CPT

## 2024-03-24 PROCEDURE — 85014 HEMATOCRIT: CPT

## 2024-03-24 PROCEDURE — 83690 ASSAY OF LIPASE: CPT

## 2024-03-24 PROCEDURE — 83605 ASSAY OF LACTIC ACID: CPT

## 2024-03-24 PROCEDURE — 93005 ELECTROCARDIOGRAM TRACING: CPT

## 2024-03-24 PROCEDURE — 96374 THER/PROPH/DIAG INJ IV PUSH: CPT

## 2024-03-24 PROCEDURE — 36415 COLL VENOUS BLD VENIPUNCTURE: CPT

## 2024-03-24 RX ORDER — SODIUM CHLORIDE 9 MG/ML
1000 INJECTION INTRAMUSCULAR; INTRAVENOUS; SUBCUTANEOUS ONCE
Refills: 0 | Status: COMPLETED | OUTPATIENT
Start: 2024-03-24 | End: 2024-03-24

## 2024-03-24 RX ORDER — FAMOTIDINE 10 MG/ML
20 INJECTION INTRAVENOUS ONCE
Refills: 0 | Status: COMPLETED | OUTPATIENT
Start: 2024-03-24 | End: 2024-03-24

## 2024-03-24 RX ADMIN — SODIUM CHLORIDE 1000 MILLILITER(S): 9 INJECTION INTRAMUSCULAR; INTRAVENOUS; SUBCUTANEOUS at 21:59

## 2024-03-24 RX ADMIN — SODIUM CHLORIDE 1000 MILLILITER(S): 9 INJECTION INTRAMUSCULAR; INTRAVENOUS; SUBCUTANEOUS at 23:16

## 2024-03-24 RX ADMIN — Medication 30 MILLILITER(S): at 21:59

## 2024-03-24 RX ADMIN — Medication 20 MILLIGRAM(S): at 22:02

## 2024-03-24 RX ADMIN — FAMOTIDINE 20 MILLIGRAM(S): 10 INJECTION INTRAVENOUS at 21:59

## 2024-03-24 NOTE — ED ADULT NURSE NOTE - OBJECTIVE STATEMENT
patient is a 24 y/o/m complaining of diffuse epigastric pain x 6 months, worse today, reports some tenderness diffusely, however no diarrhea reported, patient Alert and oriented to person, place, and time anxious affect

## 2024-03-24 NOTE — ED PROVIDER NOTE - CARE PROVIDER_API CALL
Jonathon Crocekr  Gastroenterology  [address]  [place] [address]  Phone: [phone]  Fax: [phone]  Follow Up Time:

## 2024-03-24 NOTE — ED PROVIDER NOTE - PHYSICAL EXAMINATION
Gen: no acute distress  Head: normocephalic, atraumatic  EENT: EOMI  Lung: no increased work of breathing, CTABL  CV: normal s1/s2, rrr, 2+ radial pulses b/l, no LE edema  Abd: soft, non-tender, non-distended; no rebound or guarding no CVA tenderness  MSK: no visible deformities, full range of motion in all 4 extremities  Neuro: A&Ox4; No focal neurologic deficits

## 2024-03-24 NOTE — ED PROVIDER NOTE - ATTENDING CONTRIBUTION TO CARE
I, Zac Crooks, personally saw the patient with the resident, and completed the key components of the history and physical exam. I then discussed the management plan with the resident. 22 yo male with history of gastritis which was controlled presents with worsening symptoms s/p taking new pain medication ( ibuprofen/percocet) for management of pain after recent tooth extraction.  I, Zac Crooks, personally saw the patient with the resident, and completed the key components of the history and physical exam. I then discussed the management plan with the resident.

## 2024-03-24 NOTE — ED PROVIDER NOTE - PATIENT PORTAL LINK FT
You can access the FollowMyHealth Patient Portal offered by [facility] by registering at the following website: [email or web address]. By joining [employer]’s FollowMyHealth portal, you will also be able to view your health information using other applications (apps) compatible with our system.

## 2024-03-24 NOTE — ED PROVIDER NOTE - OBJECTIVE STATEMENT
23-year-old male with history of GERD presenting with abdominal pain.  Patient reports that he has had intermittent abdominal pain for the past 6 months for which he was prescribed pantoprazole and is currently following up with GI for.  He reports not having prior endoscopy in the past.  He came into the emergency department today because he had intermittent right and left-sided abdominal pain without radiation associated with mild nausea no episodes of vomiting, fever, diarrhea.  Patient reports that 2 weeks ago he went for wisdom tooth extraction and has been taking oxycodone and ibuprofen for pain.  Last bowel movement was about 3 hours ago he denies any blood in his stool.  Denies chest pain, shortness of breath, fevers, recent travel.  Patient endorses intermittent lightheadedness   He came to the emergency department because he has a history of a 2 mm left-sided  aneurysm in the ophthalmic region that was diagnosed on CT head imaging with a no intervention is planned for.  Outpatient GI note says that patient was supposed to go for H. pylori testing.

## 2024-03-24 NOTE — ED ADULT NURSE NOTE - NSFALLUNIVINTERV_ED_ALL_ED
Bed/Stretcher in lowest position, wheels locked, appropriate side rails in place/Call bell, personal items and telephone in reach/Instruct patient to call for assistance before getting out of bed/chair/stretcher/Non-slip footwear applied when patient is off stretcher/[place] to call system/Physically safe environment - no spills, clutter or unnecessary equipment/Purposeful proactive rounding/Room/bathroom lighting operational, light cord in reach

## 2024-03-24 NOTE — ED PROVIDER NOTE - NSFOLLOWUPINSTRUCTIONS_ED_ALL_ED_FT
1) Follow up with your scheduled appointment with your gastroenterologist   2) Return to the ER for worsening or concerning symptoms  3) Continue with medication    Gastroesophageal Reflux Disease, Adult       Gastroesophageal reflux (MORIAH) happens when acid from the stomach flows up into the tube that connects the mouth and the stomach (esophagus). Normally, food travels down the esophagus and stays in the stomach to be digested. However, when a person has MORIAH, food and stomach acid sometimes move back up into the esophagus. If this becomes a more serious problem, the person may be diagnosed with a disease called gastroesophageal reflux disease (GERD). GERD occurs when the reflux:    Happens often.   Causes frequent or severe symptoms.   Causes problems such as damage to the esophagus.    When stomach acid comes in contact with the esophagus, the acid may cause soreness (inflammation) in the esophagus. Over time, GERD may create small holes (ulcers) in the lining of the esophagus.    What are the causes?  This condition is caused by a problem with the muscle between the esophagus and the stomach (lower esophageal sphincter, or LES). Normally, the LES muscle closes after food passes through the esophagus to the stomach. When the LES is weakened or abnormal, it does not close properly, and that allows food and stomach acid to go back up into the esophagus.    The LES can be weakened by certain dietary substances, medicines, and medical conditions, including:    Tobacco use.  Pregnancy.  Having a hiatal hernia.  Alcohol use.  Certain foods and beverages, such as coffee, chocolate, onions, and peppermint.    What increases the risk?  You are more likely to develop this condition if you:    Have an increased body weight.  Have a connective tissue disorder.  Use NSAID medicines.    What are the signs or symptoms?  Symptoms of this condition include:    Heartburn.  Difficult or painful swallowing.  The feeling of having a lump in the throat.  A bitter taste in the mouth.  Bad breath.  Having a large amount of saliva.  Having an upset or bloated stomach.  Belching.  Chest pain. Different conditions can cause chest pain. Make sure you see your health care provider if you experience chest pain.  Shortness of breath or wheezing.  Ongoing (chronic) cough or a night-time cough.  Wearing away of tooth enamel.  Weight loss.    How is this diagnosed?  Your health care provider will take a medical history and perform a physical exam. To determine if you have mild or severe GERD, your health care provider may also monitor how you respond to treatment. You may also have tests, including:    A test to examine your stomach and esophagus with a small camera (endoscopy).  A test that measures the acidity level in your esophagus.  A test that measures how much pressure is on your esophagus.  A barium swallow or modified barium swallow test to show the shape, size, and functioning of your esophagus.    How is this treated?  The goal of treatment is to help relieve your symptoms and to prevent complications. Treatment for this condition may vary depending on how severe your symptoms are. Your health care provider may recommend:    Changes to your diet.  Medicine.  Surgery.    Follow these instructions at home:      Eating and drinking     Follow a diet as recommended by your health care provider. This may involve avoiding foods and drinks such as:    Coffee and tea (with or without caffeine).  Drinks that contain alcohol.  Energy drinks and sports drinks.  Carbonated drinks or sodas.  Chocolate and cocoa.  Peppermint and mint flavorings.  Garlic and onions.  Horseradish.  Spicy and acidic foods, including peppers, chili powder, santos powder, vinegar, hot sauces, and barbecue sauce.  Citrus fruit juices and citrus fruits, such as oranges, brandon, and limes.  Tomato-based foods, such as red sauce, chili, salsa, and pizza with red sauce.  Fried and fatty foods, such as donuts, french fries, potato chips, and high-fat dressings.  High-fat meats, such as hot dogs and fatty cuts of red and white meats, such as rib eye steak, sausage, ham, and govea.  High-fat dairy items, such as whole milk, butter, and cream cheese.  Eat small, frequent meals instead of large meals.  Avoid drinking large amounts of liquid with your meals.  Avoid eating meals during the 2–3 hours before bedtime.  Avoid lying down right after you eat.  Do not exercise right after you eat.        Lifestyle     Do not use any products that contain nicotine or tobacco, such as cigarettes, e-cigarettes, and chewing tobacco. If you need help quitting, ask your health care provider.  Try to reduce your stress by using methods such as yoga or meditation. If you need help reducing stress, ask your health care provider.  If you are overweight, reduce your weight to an amount that is healthy for you. Ask your health care provider for guidance about a safe weight loss goal.        General instructions    Pay attention to any changes in your symptoms.  Take over-the-counter and prescription medicines only as told by your health care provider. Do not take aspirin, ibuprofen, or other NSAIDs unless your health care provider told you to do so.  Wear loose-fitting clothing. Do not wear anything tight around your waist that causes pressure on your abdomen.  Raise (elevate) the head of your bed about 6 inches (15 cm).  Avoid bending over if this makes your symptoms worse.  Keep all follow-up visits as told by your health care provider. This is important.    Contact a health care provider if:  You have:    New symptoms.  Unexplained weight loss.  Difficulty swallowing or it hurts to swallow.  Wheezing or a persistent cough.  A hoarse voice.  Your symptoms do not improve with treatment.    Get help right away if you:  Have pain in your arms, neck, jaw, teeth, or back.  Feel sweaty, dizzy, or light-headed.  Have chest pain or shortness of breath.  Vomit and your vomit looks like blood or coffee grounds.  Faint.  Have stool that is bloody or black.  Cannot swallow, drink, or eat.    Summary  Gastroesophageal reflux happens when acid from the stomach flows up into the esophagus. GERD is a disease in which the reflux happens often, causes frequent or severe symptoms, or causes problems such as damage to the esophagus.  Treatment for this condition may vary depending on how severe your symptoms are. Your health care provider may recommend diet and lifestyle changes, medicine, or surgery.  Contact a health care provider if you have new or worsening symptoms.  Take over-the-counter and prescription medicines only as told by your health care provider. Do not take aspirin, ibuprofen, or other NSAIDs unless your health care provider told you to do so.  Keep all follow-up visits as told by your health care provider. This is important.    ADDITIONAL NOTES AND INSTRUCTIONS    Please follow up with your Primary MD in 24-48 hr.  Seek immediate medical care for any new/worsening signs or symptoms.     Document Released: 9/27/2006 Document Revised: 6/26/2019 Document Reviewed: 6/26/2019  [employer] Patient Education ©2019 [employer] This information is not intended to replace advice given to you by your health care provider. Make sure you discuss any questions you have with your health care provider.

## 2024-03-24 NOTE — ED ADULT TRIAGE NOTE - CHIEF COMPLAINT QUOTE
Ambulatory to ED c/o abdominal pain and room spinning dizziness for 6 months.  pt is tachycardic 140s-150s in triage, states he feels like his heart is racing and that he feels like he will faint.  reports he was diagnosed with a brain aneurysm 1 month ago.  denies nvd, no relief with antacids. ekg completed in triage

## 2024-03-24 NOTE — ED ADULT NURSE NOTE - IN THE PAST 12 MONTHS HAVE YOU USED DRUGS OTHER THAN THOSE REQUIRED FOR MEDICAL REASON?
Pt reports productive cough, chest pain, nasal congestion, and sore throat for the past 2 days. Tried corricidin HBP,but no relief. No

## 2024-03-24 NOTE — ED PROVIDER NOTE - CLINICAL SUMMARY MEDICAL DECISION MAKING FREE TEXT BOX
23-year-old male with history of GERD presenting with abdominal pain.   Patient is overall nontoxic-appearing.  Heart rate initially elevated to 150s but spontaneously came down to 80s during interview.  Abdominal exam is benign he has no signs of peritonitis he has no focal tenderness.  Outpatient GI note reviewed and he is due for H. pylori testing.   He had CT imaging of the abdomen done at the end of last year which was unremarkable for acute pathology.  Will check CBC, CMP, lipase, lactate and trialing medications for presumed worsening of GERD symptoms. 23-year-old male with history of GERD presenting with abdominal pain.   Patient is overall nontoxic-appearing.  Heart rate initially elevated to 150s but spontaneously came down to 80s during interview.  Abdominal exam is benign he has no signs of peritonitis he has no focal tenderness.  Outpatient GI note reviewed and he is due for H. pylori testing.   He had CT imaging of the abdomen done at the end of last year which was unremarkable for acute pathology.  Will check CBC, CMP, lipase, lactate and trialing medications for presumed worsening of GERD symptoms.    Estinfa: Patient feeling much better. He has a scheduled appointment with Dr. GISSEL Crocker for evaluation of recurrent abdominal discomfort. 23-year-old male with history of GERD presenting with abdominal pain.   Patient is overall nontoxic-appearing.  Heart rate initially elevated to 150s but spontaneously came down to 80s during interview.  Abdominal exam is benign he has no signs of peritonitis he has no focal tenderness.  Outpatient GI note reviewed and he is due for H. pylori testing.   He had CT imaging of the abdomen done at the end of last year which was unremarkable for acute pathology.  Will check CBC, CMP, lipase, lactate and trialing medications for presumed worsening of GERD symptoms.    Estinfa: Patient feeling much better after medication. He has a scheduled appointment with Dr. GISSEL Crocker for evaluation of recurrent abdominal discomfort.

## 2024-03-25 VITALS
TEMPERATURE: 98 F | HEART RATE: 76 BPM | DIASTOLIC BLOOD PRESSURE: 63 MMHG | RESPIRATION RATE: 18 BRPM | OXYGEN SATURATION: 98 % | SYSTOLIC BLOOD PRESSURE: 112 MMHG

## 2024-03-28 ENCOUNTER — EMERGENCY (EMERGENCY)
Facility: HOSPITAL | Age: 24
LOS: 1 days | Discharge: HOME | End: 2024-03-28
Attending: EMERGENCY MEDICINE
Payer: COMMERCIAL

## 2024-03-28 VITALS
WEIGHT: 190.48 LBS | DIASTOLIC BLOOD PRESSURE: 75 MMHG | HEIGHT: 71 IN | SYSTOLIC BLOOD PRESSURE: 145 MMHG | HEART RATE: 164 BPM | OXYGEN SATURATION: 98 % | TEMPERATURE: 98 F | RESPIRATION RATE: 20 BRPM

## 2024-03-28 VITALS
TEMPERATURE: 98 F | OXYGEN SATURATION: 99 % | SYSTOLIC BLOOD PRESSURE: 134 MMHG | DIASTOLIC BLOOD PRESSURE: 88 MMHG | HEART RATE: 95 BPM | RESPIRATION RATE: 15 BRPM

## 2024-03-28 PROBLEM — K21.9 GASTRO-ESOPHAGEAL REFLUX DISEASE WITHOUT ESOPHAGITIS: Chronic | Status: ACTIVE | Noted: 2024-03-24

## 2024-03-28 LAB
ALBUMIN SERPL ELPH-MCNC: 4.3 G/DL — SIGNIFICANT CHANGE UP (ref 3.3–5.2)
ALP SERPL-CCNC: 122 U/L — HIGH (ref 40–120)
ALT FLD-CCNC: 72 U/L — HIGH
ANION GAP SERPL CALC-SCNC: 14 MMOL/L — SIGNIFICANT CHANGE UP (ref 5–17)
AST SERPL-CCNC: 38 U/L — SIGNIFICANT CHANGE UP
BASOPHILS # BLD AUTO: 0.02 K/UL — SIGNIFICANT CHANGE UP (ref 0–0.2)
BASOPHILS NFR BLD AUTO: 0.2 % — SIGNIFICANT CHANGE UP (ref 0–2)
BILIRUB SERPL-MCNC: 0.2 MG/DL — LOW (ref 0.4–2)
BUN SERPL-MCNC: 12.4 MG/DL — SIGNIFICANT CHANGE UP (ref 8–20)
CALCIUM SERPL-MCNC: 9.1 MG/DL — SIGNIFICANT CHANGE UP (ref 8.4–10.5)
CHLORIDE SERPL-SCNC: 101 MMOL/L — SIGNIFICANT CHANGE UP (ref 96–108)
CO2 SERPL-SCNC: 26 MMOL/L — SIGNIFICANT CHANGE UP (ref 22–29)
CREAT SERPL-MCNC: 0.84 MG/DL — SIGNIFICANT CHANGE UP (ref 0.5–1.3)
D DIMER BLD IA.RAPID-MCNC: 168 NG/ML DDU — SIGNIFICANT CHANGE UP
EGFR: 125 ML/MIN/1.73M2 — SIGNIFICANT CHANGE UP
EOSINOPHIL # BLD AUTO: 0.06 K/UL — SIGNIFICANT CHANGE UP (ref 0–0.5)
EOSINOPHIL NFR BLD AUTO: 0.7 % — SIGNIFICANT CHANGE UP (ref 0–6)
GLUCOSE SERPL-MCNC: 108 MG/DL — HIGH (ref 70–99)
HCT VFR BLD CALC: 42.4 % — SIGNIFICANT CHANGE UP (ref 39–50)
HGB BLD-MCNC: 14.7 G/DL — SIGNIFICANT CHANGE UP (ref 13–17)
IMM GRANULOCYTES NFR BLD AUTO: 0.3 % — SIGNIFICANT CHANGE UP (ref 0–0.9)
LYMPHOCYTES # BLD AUTO: 1.33 K/UL — SIGNIFICANT CHANGE UP (ref 1–3.3)
LYMPHOCYTES # BLD AUTO: 14.7 % — SIGNIFICANT CHANGE UP (ref 13–44)
MCHC RBC-ENTMCNC: 29.2 PG — SIGNIFICANT CHANGE UP (ref 27–34)
MCHC RBC-ENTMCNC: 34.7 GM/DL — SIGNIFICANT CHANGE UP (ref 32–36)
MCV RBC AUTO: 84.3 FL — SIGNIFICANT CHANGE UP (ref 80–100)
MONOCYTES # BLD AUTO: 0.59 K/UL — SIGNIFICANT CHANGE UP (ref 0–0.9)
MONOCYTES NFR BLD AUTO: 6.5 % — SIGNIFICANT CHANGE UP (ref 2–14)
NEUTROPHILS # BLD AUTO: 7.03 K/UL — SIGNIFICANT CHANGE UP (ref 1.8–7.4)
NEUTROPHILS NFR BLD AUTO: 77.6 % — HIGH (ref 43–77)
PLATELET # BLD AUTO: 312 K/UL — SIGNIFICANT CHANGE UP (ref 150–400)
POTASSIUM SERPL-MCNC: 4.2 MMOL/L — SIGNIFICANT CHANGE UP (ref 3.5–5.3)
POTASSIUM SERPL-SCNC: 4.2 MMOL/L — SIGNIFICANT CHANGE UP (ref 3.5–5.3)
PROT SERPL-MCNC: 7.4 G/DL — SIGNIFICANT CHANGE UP (ref 6.6–8.7)
RBC # BLD: 5.03 M/UL — SIGNIFICANT CHANGE UP (ref 4.2–5.8)
RBC # FLD: 12.2 % — SIGNIFICANT CHANGE UP (ref 10.3–14.5)
SODIUM SERPL-SCNC: 141 MMOL/L — SIGNIFICANT CHANGE UP (ref 135–145)
TSH SERPL-MCNC: 1.22 UIU/ML — SIGNIFICANT CHANGE UP (ref 0.27–4.2)
WBC # BLD: 9.06 K/UL — SIGNIFICANT CHANGE UP (ref 3.8–10.5)
WBC # FLD AUTO: 9.06 K/UL — SIGNIFICANT CHANGE UP (ref 3.8–10.5)

## 2024-03-28 PROCEDURE — 96374 THER/PROPH/DIAG INJ IV PUSH: CPT

## 2024-03-28 PROCEDURE — 85379 FIBRIN DEGRADATION QUANT: CPT

## 2024-03-28 PROCEDURE — 84443 ASSAY THYROID STIM HORMONE: CPT

## 2024-03-28 PROCEDURE — 99285 EMERGENCY DEPT VISIT HI MDM: CPT

## 2024-03-28 PROCEDURE — 99285 EMERGENCY DEPT VISIT HI MDM: CPT | Mod: 25

## 2024-03-28 PROCEDURE — 85025 COMPLETE CBC W/AUTO DIFF WBC: CPT

## 2024-03-28 PROCEDURE — 71045 X-RAY EXAM CHEST 1 VIEW: CPT | Mod: 26

## 2024-03-28 PROCEDURE — 93005 ELECTROCARDIOGRAM TRACING: CPT

## 2024-03-28 PROCEDURE — 93010 ELECTROCARDIOGRAM REPORT: CPT

## 2024-03-28 PROCEDURE — 36415 COLL VENOUS BLD VENIPUNCTURE: CPT

## 2024-03-28 PROCEDURE — 71045 X-RAY EXAM CHEST 1 VIEW: CPT

## 2024-03-28 PROCEDURE — 80053 COMPREHEN METABOLIC PANEL: CPT

## 2024-03-28 PROCEDURE — 70450 CT HEAD/BRAIN W/O DYE: CPT | Mod: MC

## 2024-03-28 RX ORDER — ACETAMINOPHEN 500 MG
1000 TABLET ORAL ONCE
Refills: 0 | Status: COMPLETED | OUTPATIENT
Start: 2024-03-28 | End: 2024-03-28

## 2024-03-28 RX ORDER — SODIUM CHLORIDE 9 MG/ML
1000 INJECTION INTRAMUSCULAR; INTRAVENOUS; SUBCUTANEOUS ONCE
Refills: 0 | Status: COMPLETED | OUTPATIENT
Start: 2024-03-28 | End: 2024-03-28

## 2024-03-28 RX ADMIN — Medication 400 MILLIGRAM(S): at 20:25

## 2024-03-28 RX ADMIN — SODIUM CHLORIDE 1000 MILLILITER(S): 9 INJECTION INTRAMUSCULAR; INTRAVENOUS; SUBCUTANEOUS at 20:25

## 2024-03-28 RX ADMIN — Medication 1 MILLIGRAM(S): at 20:25

## 2024-03-28 NOTE — ED PROVIDER NOTE - NSFOLLOWUPINSTRUCTIONS_ED_ALL_ED_FT
Headache    A headache is pain or discomfort felt around the head or neck area. The specific cause of a headache may not be found as there are many types including tension headaches, migraine headaches, and cluster headaches. Watch your condition for any changes. Things you can do to manage your pain include taking over the counter and prescription medications as instructed by your health care provider, lying down in a dark quiet room, limiting stress, getting regular sleep, and refraining from alcohol and tobacco products.    SEEK IMMEDIATE MEDICAL CARE IF YOU HAVE ANY OF THE FOLLOWING SYMPTOMS: fever, vomiting, stiff neck, loss of vision, problems with speech, muscle weakness, loss of balance, trouble walking, passing out, or confusion.     Palpitations    A palpitation is the feeling that your heartbeat is irregular or is faster than normal. It may feel like your heart is fluttering or skipping a beat. They may be caused by many things, including smoking, caffeine, alcohol, stress, and certain medicines. Although most causes of palpitations are not serious, palpitations can be a sign of a serious medical problem. Avoid caffeine, alcohol, and tobacco products at home. Try to reduce stress and anxiety and make sure to get plenty of rest.     SEEK IMMEDIATE MEDICAL CARE IF YOU HAVE ANY OF THE FOLLOWING SYMPTOMS: chest pain, shortness of breath, severe headache, dizziness/lightheadedness, or fainting.

## 2024-03-28 NOTE — ED PROVIDER NOTE - PHYSICAL EXAMINATION
Gen: No acute distress, non toxic  HEENT: Mucous membranes moist, pink conjunctivae, EOMI  neck: no swelling, no midline ttp. full rom.   CV: pulse varies from  when pt talking.   Resp: CTAB, normal rate and effort  GI: Abdomen soft, NT, ND. No rebound, no guarding  : No CVAT  Neuro: A&O x 3, moving all 4 extremities. cn 2-12 wnl. nl motor/sensation.   MSK: No spine or joint tenderness to palpation  Skin: No rashes. intact and perfused.

## 2024-03-28 NOTE — ED PROVIDER NOTE - OBJECTIVE STATEMENT
24 y/o male hx gastritis, fatty liver present with months of head/neck pain, and intermittently fast heart rate. 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 Santana, had cta head/neck otherwise without findings. has had dizziness/feeling off balance unchanged for years with extensive w/u over that time. also had mcot and echo with [facility] per pt/father for fast heart rate and was told it was nothing. no cp/sob. no f/c. no new neuro symptoms, no vision changes. no other complaints. no hx of blockage, pt had episode of dizziness/neck discomfort when 10 without findings per father.

## 2024-03-28 NOTE — ED PROVIDER NOTE - CLINICAL SUMMARY MEDICAL DECISION MAKING FREE TEXT BOX
Fox: 24 y/o male hx gastritis, fatty liver present with months of head/neck pain, and intermittently fast heart rate. pulse was 90 when resting, when he began talking to me bounced around from 110-155, pt gets worked up/anxious during these episodes. no neuro symptoms. no visoin changes, just had cta 1 month ago. c/o continued head discomfort. had mcot and echo for fast heart rate. otherwise well. will check labs, observe on monitor. symptom control, cth. reassess Fox: 22 y/o male hx gastritis, fatty liver present with months of head/neck pain, and intermittently fast heart rate. pulse was 90 when resting, when he began talking to me bounced around from 110-155, pt gets worked up/anxious during these episodes. no neuro symptoms. no visoin changes, just had cta 1 month ago. c/o continued head discomfort. had mcot and echo for fast heart rate. otherwise well. will check labs, observe on monitor. symptom control, cth. reassess    pt has been resting comfortably, pulse ~70's when sleeping, goes to ~90 when awake. w/u without acute findings. has cards, return prcautions. stable for d/c.

## 2024-03-28 NOTE — ED PROVIDER NOTE - PATIENT PORTAL LINK FT
You can access the FollowMyHealth Patient Portal offered by [facility] by registering at the following website: [email or web address]. By joining [employer]’s FollowMyHealth portal, you will also be able to view your health information using other applications (apps) compatible with our system.

## 2024-03-28 NOTE — ED ADULT TRIAGE NOTE - CHIEF COMPLAINT QUOTE
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

## 2024-03-28 NOTE — ED ADULT NURSE NOTE - OBJECTIVE STATEMENT
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 pt has been c/o of intermittent head pain for the last few days. pt also feeling like his HR has been fast. pt ST in 110s on telemetry, father at bedside. no neuro deficits noted

## 2024-03-29 PROCEDURE — 70450 CT HEAD/BRAIN W/O DYE: CPT | Mod: 26,MC

## 2024-03-29 NOTE — ED PEDIATRIC NURSE NOTE - CAS DISCH BELONGINGS RETURNED
Subjective:       Patient ID: Alexsandra Garcia is a 68 y.o. female.    Chief Complaint: Urinary Tract Infection (Hx of UTI currently being treated w/ bactrim. Does have daily macrobid )      HPI: 68-year-old female patient presenting to clinic to [facility].  She is a previous patient of Dr. Pacheco.  Patient takes prophylactic Macrobid.  She has not had an infection for 2 years since starting daily antibiotic.  She recently developed symptoms and is being treated with Bactrim.         Past Medical History:   Past Medical History:   Diagnosis Date    Anxiety     Arthritis     GERD (gastroesophageal reflux disease)     Urinary tract infection        Past Surgical Historical:   Past Surgical History:   Procedure Laterality Date    ELBOW SURGERY Right 1980    FOOT SURGERY Left 2019    HIP SURGERY Right 2020    NECK SURGERY  1979    NECK SURGERY  1983        Medications:   Medication List with Changes/Refills   Current Medications    BUSPIRONE (BUSPAR) 10 MG TABLET    Take 10 mg by mouth 2 (two) times daily.    CLONAZEPAM (KLONOPIN) 1 MG TABLET    Take 1 mg by mouth nightly as needed.    DULOXETINE (CYMBALTA) 60 MG CAPSULE    Take 60 mg by mouth once daily.    FLUTICASONE PROPIONATE (FLONASE) 50 MCG/ACTUATION NASAL SPRAY    by Each Nostril route.    HYDROCODONE-ACETAMINOPHEN (NORCO)  MG PER TABLET    hydrocodone 10 mg-acetaminophen 325 mg tablet    HYDROXYZINE HCL (ATARAX) 25 MG TABLET    Take 25 mg by mouth 3 (three) times daily.    MONTELUKAST (SINGULAIR) 10 MG TABLET    Take 10 mg by mouth once daily.    OMEPRAZOLE (PRILOSEC) 40 MG CAPSULE    Take 40 mg by mouth.    PANTOPRAZOLE (PROTONIX) 40 MG TABLET    pantoprazole 40 mg tablet,delayed release    ROPINIROLE (REQUIP) 0.25 MG TABLET    Take 0.25 mg by mouth.    SULFAMETHOXAZOLE-TRIMETHOPRIM 400-80MG (BACTRIM,SEPTRA) 400-80 MG PER TABLET    Take 1 tablet by mouth once daily.    SULFAMETHOXAZOLE-TRIMETHOPRIM 800-160MG (BACTRIM DS) 800-160 MG TAB    Take 1  tablet by mouth 2 (two) times daily.    TIZANIDINE (ZANAFLEX) 2 MG TABLET    Take 2 mg by mouth 2 (two) times daily.   Discontinued Medications    HYDROCODONE-ACETAMINOPHEN (NORCO)  MG PER TABLET    Take 1 tablet by mouth every 8 (eight) hours as needed for Pain.    PANTOPRAZOLE (PROTONIX) 40 MG TABLET    Take 40 mg by mouth once daily.        Past Social History:   Social History     Socioeconomic History    Marital status:    Tobacco Use    Smoking status: Never    Smokeless tobacco: Never   Substance and Sexual Activity    Alcohol use: Yes     Comment: a couple of beers on a friday    Drug use: Yes     Types: Marijuana     Comment: prescibed marijuana       Allergies:   Review of patient's allergies indicates:   Allergen Reactions    Pcn [penicillins] Rash        Family History:   Family History   Problem Relation Age of Onset    Cancer Father     Bladder Cancer Father     Cancer Mother     Breast cancer Mother         Review of Systems:  Review of Systems   Constitutional:  Negative for activity change, appetite change, chills, diaphoresis, fatigue, fever and unexpected weight change.   HENT:  Negative for congestion, dental problem, drooling, ear discharge, ear pain, facial swelling, hearing loss, mouth sores, nosebleeds, postnasal drip, rhinorrhea, sinus pressure, sinus pain, sneezing, sore throat, tinnitus, trouble swallowing and voice change.    Eyes:  Negative for photophobia, pain, discharge, redness, itching and visual disturbance.   Respiratory:  Negative for apnea, cough, choking, chest tightness, shortness of breath, wheezing and stridor.    Cardiovascular:  Negative for chest pain and leg swelling.   Gastrointestinal:  Negative for abdominal distention, abdominal pain, anal bleeding, blood in stool, constipation, diarrhea, nausea, rectal pain and vomiting.   Endocrine: Negative for cold intolerance, heat intolerance, polydipsia, polyphagia and polyuria.   Genitourinary: Negative.   Negative for decreased urine volume, difficulty urinating, dyspareunia, dysuria, enuresis, flank pain, frequency, genital sores, hematuria, menstrual problem, pelvic pain, urgency, vaginal bleeding, vaginal discharge and vaginal pain.   Musculoskeletal:  Negative for arthralgias, back pain, gait problem, joint swelling, myalgias, neck pain and neck stiffness.   Skin:  Negative for color change, pallor, rash and wound.   Allergic/Immunologic: Negative for environmental allergies, food allergies and immunocompromised state.   Neurological:  Negative for dizziness, tremors, seizures, syncope, facial asymmetry, speech difficulty, weakness, light-headedness, numbness and headaches.   Hematological:  Negative for adenopathy. Does not bruise/bleed easily.   Psychiatric/Behavioral:  Negative for agitation, behavioral problems, confusion, decreased concentration, dysphoric mood, hallucinations, self-injury, sleep disturbance and suicidal ideas. The patient is not nervous/anxious and is not hyperactive.        Physical Exam:  Physical Exam  Exam conducted with a chaperone present.   Constitutional:       Appearance: Normal appearance.   HENT:      Head: Normocephalic.      Nose: Nose normal.      Mouth/Throat:      Mouth: Mucous membranes are moist.      Pharynx: Oropharynx is clear.   Eyes:      Extraocular Movements: Extraocular movements intact.      Conjunctiva/sclera: Conjunctivae normal.      Pupils: Pupils are equal, round, and reactive to light.   Cardiovascular:      Rate and Rhythm: Normal rate and regular rhythm.      Pulses: Normal pulses.      Heart sounds: Normal heart sounds.   Pulmonary:      Effort: Pulmonary effort is normal.      Breath sounds: Normal breath sounds.   Abdominal:      General: Abdomen is flat. Bowel sounds are normal.      Palpations: Abdomen is soft.      Hernia: There is no hernia in the left inguinal area or right inguinal area.   Genitourinary:     General: Normal vulva.      Pubic Area:  No rash or pubic lice.       Labia:         Right: No rash, tenderness, lesion or injury.         Left: No rash, tenderness, lesion or injury.       Urethra: No prolapse, urethral pain, urethral swelling or urethral lesion.      Rectum: Normal.   Musculoskeletal:         General: Normal range of motion.      Cervical back: Normal range of motion and neck supple.   Lymphadenopathy:      Lower Body: No right inguinal adenopathy. No left inguinal adenopathy.   Skin:     General: Skin is warm and dry.      Capillary Refill: Capillary refill takes less than 2 seconds.   Neurological:      General: No focal deficit present.      Mental Status: She is alert and oriented to person, place, and time. Mental status is at baseline.   Psychiatric:         Mood and Affect: Mood normal.         Behavior: Behavior normal.         Thought Content: Thought content normal.         Judgment: Judgment normal.         Assessment/Plan:     Recurrent urinary tract infection:  Continue daily prophylactic Macrobid .  Patient currently on Bactrim for UTI treated by local urgent care.  Culture was done on her urine.  Patient feels this infection was associated with intercourse.  Instructed patient to take Macrobid 50 mg twice a day for 2 days following intercourse in the future and increase oral hydration.  Reviewed prevention of urinary tract infections following sexual intercourse.  Instructed patient to complete urine drop-off if she develops symptoms of UTI in the future.  Problem List Items Addressed This Visit    None  Visit Diagnoses       UTI (urinary tract infection)        Relevant Orders    POCT Urinalysis(Instrument)                Not applicable

## 2024-04-01 ENCOUNTER — NON-APPOINTMENT (OUTPATIENT)
Age: 24
End: 2024-04-01

## 2024-04-01 ENCOUNTER — APPOINTMENT (OUTPATIENT)
Dept: CARDIOLOGY | Facility: CLINIC | Age: 24
End: 2024-04-01
Payer: MEDICAID

## 2024-04-01 VITALS — SYSTOLIC BLOOD PRESSURE: 124 MMHG | DIASTOLIC BLOOD PRESSURE: 82 MMHG

## 2024-04-01 VITALS
DIASTOLIC BLOOD PRESSURE: 80 MMHG | BODY MASS INDEX: 26.32 KG/M2 | OXYGEN SATURATION: 98 % | WEIGHT: 188 LBS | HEIGHT: 71 IN | SYSTOLIC BLOOD PRESSURE: 126 MMHG | HEART RATE: 135 BPM

## 2024-04-01 VITALS
TEMPERATURE: 97.5 F | HEART RATE: 129 BPM | RESPIRATION RATE: 20 BRPM | SYSTOLIC BLOOD PRESSURE: 140 MMHG | DIASTOLIC BLOOD PRESSURE: 72 MMHG | OXYGEN SATURATION: 96 %

## 2024-04-01 DIAGNOSIS — R94.31 ABNORMAL ELECTROCARDIOGRAM [ECG] [EKG]: ICD-10-CM

## 2024-04-01 PROCEDURE — 99214 OFFICE O/P EST MOD 30 MIN: CPT | Mod: 25

## 2024-04-01 PROCEDURE — 93000 ELECTROCARDIOGRAM COMPLETE: CPT

## 2024-04-01 NOTE — REVIEW OF SYSTEMS
[Headache] : headache [Chest Discomfort] : chest discomfort [Tinnitus] : tinnitus [Dizziness] : dizziness [Negative] : Heme/Lymph

## 2024-04-03 ENCOUNTER — APPOINTMENT (OUTPATIENT)
Dept: GASTROENTEROLOGY | Facility: CLINIC | Age: 24
End: 2024-04-03
Payer: MEDICAID

## 2024-04-03 VITALS
OXYGEN SATURATION: 97 % | DIASTOLIC BLOOD PRESSURE: 84 MMHG | HEIGHT: 71 IN | SYSTOLIC BLOOD PRESSURE: 139 MMHG | RESPIRATION RATE: 14 BRPM | HEART RATE: 125 BPM | BODY MASS INDEX: 26.32 KG/M2 | TEMPERATURE: 98.7 F | WEIGHT: 188 LBS

## 2024-04-03 DIAGNOSIS — R10.84 GENERALIZED ABDOMINAL PAIN: ICD-10-CM

## 2024-04-03 DIAGNOSIS — K29.00 ACUTE GASTRITIS W/OUT BLEEDING: ICD-10-CM

## 2024-04-03 DIAGNOSIS — K59.00 CONSTIPATION, UNSPECIFIED: ICD-10-CM

## 2024-04-03 DIAGNOSIS — I67.1 CEREBRAL ANEURYSM, NONRUPTURED: ICD-10-CM

## 2024-04-03 PROCEDURE — G2211 COMPLEX E/M VISIT ADD ON: CPT | Mod: NC,1L

## 2024-04-03 PROCEDURE — 99214 OFFICE O/P EST MOD 30 MIN: CPT

## 2024-04-03 RX ORDER — PANTOPRAZOLE 40 MG/1
40 TABLET, DELAYED RELEASE ORAL
Qty: 90 | Refills: 0 | Status: DISCONTINUED | COMMUNITY
End: 2024-04-03

## 2024-04-03 NOTE — PHYSICAL EXAM
[Normal Voice/Communication] : normal voice/communication [Alert] : alert [No Acute Distress] : no acute distress [Healthy Appearing] : healthy appearing [Hearing Threshold Finger Rub Not Breathitt] : hearing was normal [Sclera] : the sclera and conjunctiva were normal [Normal Appearance] : the appearance of the neck was normal [No Respiratory Distress] : no respiratory distress [No Acc Muscle Use] : no accessory muscle use [Respiration, Rhythm And Depth] : normal respiratory rhythm and effort [Heart Rate And Rhythm] : heart rate was normal and rhythm regular [Normal S1, S2] : normal S1 and S2 [Auscultation Breath Sounds / Voice Sounds] : lungs were clear to auscultation bilaterally [Abdomen Tenderness] : non-tender [Bowel Sounds] : normal bowel sounds [No Masses] : no abdominal mass palpated [Abdomen Soft] : soft [] : no hepatosplenomegaly [Oriented To Time, Place, And Person] : oriented to person, place, and time [de-identified] : dry lips

## 2024-04-03 NOTE — HISTORY OF PRESENT ILLNESS
[FreeTextEntry1] : Patient is a 23 year old male who presents for follow up visit.   Pt was previously seen, was having loose stools and states he felt a "suffocating pressure in esophagus" which has improved while on PPI. He presents today concerned that symptoms he is feeling is a side effect of the PPI. He states his heart rate goes up whenever he takes the PPI and feels a pulsating sensation in his left ear only - he has been having problems with his wisdom teeth the last few months. He is s/p dental surgery in 11/2023 and was treated with antibiotics. Was on Oxycodone and Ibuprofen for wisdom teeth, prescribed to him by multiple ERs. He was on antibiotics as well, was having diarrhea which resolved.   Last visit, he was told to d/c PPI but he continues to take it. He was given rx for UBT but did not do it. Now he is c/o bristol type 1-3 stools with intestinal cramping wtih BMs, resolves after BM.   Pt had labs and CT a/p in ER in December which was normal.   Patient denies any significant cardiac or pulmonary conditions.   Patient denies dysphagia, nausea, vomiting, or unexplained weight loss.  Pt recently found to have small cerebral aneurysm, following neurology.

## 2024-04-03 NOTE — ASSESSMENT
[FreeTextEntry1] : Patient is a 23 year old male who presents for follow up visit.   Lower abdominal cramping wtih BMs with Hurst type 1-3 stools - Add fiber supplement daily with Metamucil - Increased hydration - Add probiotic daily  Mother with h.pylori, unable to tolerate PPI - D/C PPI - Do UBT to r/o h.pylori - Famotidine 20mg PO QHS prn  RTC in 3-4 months, sooner if needed   Could consider EGD if symptoms persist, would need to discuss with neurology given his cerebral aneurysm

## 2024-04-03 NOTE — ADDENDUM
[FreeTextEntry1] : I, Yari Ng PA-C, acted as a scribe for the services dictated to me by LINUS Callaway in this document on Apr 03, 2024 for АЛЕКСАНДР MCGILLILLA  I have personally seen and examined the patient. I agree with the history, physical examination, assessment and recommendations as noted above.  Linus Crocker MD Gastroenterology

## 2024-04-10 ENCOUNTER — NON-APPOINTMENT (OUTPATIENT)
Age: 24
End: 2024-04-10

## 2024-04-11 LAB — UREA BREATH TEST QL: NEGATIVE

## 2024-04-15 ENCOUNTER — OFFICE (OUTPATIENT)
Dept: URBAN - METROPOLITAN AREA CLINIC 115 | Facility: CLINIC | Age: 24
Setting detail: OPHTHALMOLOGY
End: 2024-04-15
Payer: COMMERCIAL

## 2024-04-15 DIAGNOSIS — H47.233: ICD-10-CM

## 2024-04-15 DIAGNOSIS — H04.123: ICD-10-CM

## 2024-04-15 PROBLEM — I67.1 CEREBRAL ANEURYSM,NONRUPTURED: Status: ACTIVE | Noted: 2024-04-15

## 2024-04-15 PROBLEM — G43.109 MIGRAINE WITH AURA, NOT INTRACTABLE, WITHOUT STATUS MIGRAINOSUS: Status: ACTIVE | Noted: 2024-04-15

## 2024-04-15 PROCEDURE — 99213 OFFICE O/P EST LOW 20 MIN: CPT | Performed by: OPHTHALMOLOGY

## 2024-04-15 PROCEDURE — 92133 CPTRZD OPH DX IMG PST SGM ON: CPT | Performed by: OPHTHALMOLOGY

## 2024-04-15 PROCEDURE — 92083 EXTENDED VISUAL FIELD XM: CPT | Performed by: OPHTHALMOLOGY

## 2024-04-22 ENCOUNTER — APPOINTMENT (OUTPATIENT)
Dept: CARDIOLOGY | Facility: CLINIC | Age: 24
End: 2024-04-22

## 2024-05-10 ENCOUNTER — APPOINTMENT (OUTPATIENT)
Dept: CARDIOLOGY | Facility: CLINIC | Age: 24
End: 2024-05-10
Payer: MEDICAID

## 2024-05-10 PROCEDURE — 93306 TTE W/DOPPLER COMPLETE: CPT

## 2024-05-12 NOTE — PHYSICAL EXAM
[Well Developed] : well developed [Well Nourished] : well nourished [No Acute Distress] : no acute distress [Normal Conjunctiva] : normal conjunctiva [Normal Venous Pressure] : normal venous pressure [No Carotid Bruit] : no carotid bruit [Clear Lung Fields] : clear lung fields [No Respiratory Distress] : no respiratory distress  [Good Air Entry] : good air entry [Soft] : abdomen soft [No Masses/organomegaly] : no masses/organomegaly [Non Tender] : non-tender [Normal Bowel Sounds] : normal bowel sounds [Normal Gait] : normal gait [No Cyanosis] : no cyanosis [No Edema] : no edema [No Clubbing] : no clubbing [No Varicosities] : no varicosities [No Skin Lesions] : no skin lesions [No Rash] : no rash [Moves all extremities] : moves all extremities [Normal Speech] : normal speech [No Focal Deficits] : no focal deficits [Alert and Oriented] : alert and oriented [Normal memory] : normal memory [de-identified] : The patient is tachycardic, 2/6 systolic murmur, no appreciable rub

## 2024-05-12 NOTE — CARDIOLOGY SUMMARY
[de-identified] : Normal sinus rhythm, SD depression, nonspecific ST-T wave changes suggestive of pericarditis [de-identified] : - Nonalcoholic fatty liver - Cerebral aneurysm: Angiogram pending -Gastritis - ECHOCARDIOGRAM: 5/10/2024, EF 60%, trace mitral and tricuspid regurgitation, PA sys24 -Abnormal ECG: Suggestive of pericarditis -HOLTER MONITOR: 4/22/2024, 6-day, NSR 73 (), rare APCs, no PVCs, 16 sxms =SR

## 2024-05-12 NOTE — HISTORY OF PRESENT ILLNESS
[FreeTextEntry1] : The patient is a 23-year-old male with a past medical history remarkable for reported nonalcoholic fatty liver, gastritis, and a cerebral aneurysm who presents for evaluation of chest pain. The patient reports that he has experienced tachycardia for approximately 1 year.  It occurs when standing.  Over the last week he has developed dizziness when standing.  He reports dizziness followed by tinnitus and a headache.  The patient reports chest pain that started several days ago.  He is unable to describe the quality of the discomfort; however, it is constant and not ameliorated or exacerbated by any intervention.  The patient was evaluated at [facility].

## 2024-05-12 NOTE — DISCUSSION/SUMMARY
[EKG obtained to assist in diagnosis and management of assessed problem(s)] : EKG obtained to assist in diagnosis and management of assessed problem(s) [FreeTextEntry1] : Tachycardia/dizziness Outpatient telemetry was ordered to rule out a significant tachy arrhythmia as the etiology of his tachycardia and dizziness. The patient was instructed to increase his water intake  Chest pain The patient's ECG is suggestive of pericarditis.  Due to his history of gastritis he reports that he is intolerant of NSAIDs. In view of this,  NSAIDs and colchicine were not prescribed at this time An echocardiogram was ordered to rule out a cardiomyopathy, valvular abnormality, or pericardial effusion as the etiology of his chest pain, tachycardia, dizziness, abnormal ECG, and murmur.   After review of the above results, we will consider rilonacept so as to avoid NSAIDs and steroids   Headache/tinnitus The patient was instructed to follow-up with his neurologist in view of his history of a reported cerebral aneurysm  RTO after testing

## 2024-05-22 ENCOUNTER — APPOINTMENT (OUTPATIENT)
Dept: CARDIOLOGY | Facility: CLINIC | Age: 24
End: 2024-05-22
Payer: MEDICAID

## 2024-05-22 VITALS
WEIGHT: 199 LBS | SYSTOLIC BLOOD PRESSURE: 114 MMHG | OXYGEN SATURATION: 99 % | DIASTOLIC BLOOD PRESSURE: 68 MMHG | HEART RATE: 70 BPM | BODY MASS INDEX: 27.86 KG/M2 | HEIGHT: 71 IN

## 2024-05-22 DIAGNOSIS — R42 DIZZINESS AND GIDDINESS: ICD-10-CM

## 2024-05-22 DIAGNOSIS — R00.0 TACHYCARDIA, UNSPECIFIED: ICD-10-CM

## 2024-05-22 DIAGNOSIS — R07.9 CHEST PAIN, UNSPECIFIED: ICD-10-CM

## 2024-05-22 PROCEDURE — 99214 OFFICE O/P EST MOD 30 MIN: CPT | Mod: 25

## 2024-05-22 PROCEDURE — 93000 ELECTROCARDIOGRAM COMPLETE: CPT

## 2024-05-22 RX ORDER — FAMOTIDINE 20 MG/1
20 TABLET, FILM COATED ORAL
Qty: 90 | Refills: 1 | Status: DISCONTINUED | COMMUNITY
Start: 2024-04-03 | End: 2024-05-22

## 2024-05-22 RX ORDER — LIDOCAINE HCL 4 %
250 MCG CREAM (GRAM) TOPICAL WEEKLY
Refills: 0 | Status: ACTIVE | COMMUNITY

## 2024-05-22 RX ORDER — IBUPROFEN 200 MG/1
200 TABLET ORAL
Refills: 0 | Status: DISCONTINUED | COMMUNITY
End: 2024-05-22

## 2024-05-22 NOTE — DISCUSSION/SUMMARY
[FreeTextEntry1] : Tachycardia/dizziness During 6 days of outpatient telemetry 16 symptoms corresponded to sinus rhythm  Continue increased water intake Increase aerobic activity  The patient's symptoms will be reevaluated in 3 months.  If they continue we will consider a tilt table exam to help determine if the patient's symptoms are from POTS   Chest pain Improved. Probably secondary to resolved pericarditis  Due to the patient's history of gastritis he reports intolerance to NSAIDs.  In view of this NSAIDs and colchicine were not prescribed   RTO 3 months [EKG obtained to assist in diagnosis and management of assessed problem(s)] : EKG obtained to assist in diagnosis and management of assessed problem(s)

## 2024-05-22 NOTE — HISTORY OF PRESENT ILLNESS
[FreeTextEntry1] : The patient is a 23-year-old male with a past medical history remarkable for reported nonalcoholic fatty liver, gastritis, and a cerebral aneurysm who presented for evaluation of chest pain, tachycardia, and dizziness.. Echocardiography was performed on May 10 and demonstrated a normal ejection fraction and the absence of valve disease. As noted below the patient's ECG has improved.  The patient's AR depression and ST segment changes have improved.   6 days of Holter monitoring demonstrated normal sinus rhythm with an average heart rate of 73 bpm.  There were rare APCs and PVCs.  16 symptoms corresponded to sinus rhythm. The patient reports that his chest pain has improved.  His dizziness occurs with standing.  He consumes 6 bottles of water daily

## 2024-05-22 NOTE — CARDIOLOGY SUMMARY
[de-identified] : Improved from his prior.  There is normal sinus rhythm with resolution of his MN depression and improvement of his ST segment changes.    [de-identified] : - Nonalcoholic fatty liver - Cerebral aneurysm: Angiogram pending -Gastritis - ECHOCARDIOGRAM: 5/10/2024, EF 60%, trace mitral and tricuspid regurgitation, PA sys24 -Abnormal ECG 4/1/2024,  Suggestive of pericarditis -HOLTER MONITOR: 4/22/2024, 6-day, NSR 73 (), rare APCs, no PVCs, 16 sxms =SR

## 2024-05-22 NOTE — PHYSICAL EXAM
[Well Developed] : well developed [Well Nourished] : well nourished [No Acute Distress] : no acute distress [Normal Conjunctiva] : normal conjunctiva [Normal Venous Pressure] : normal venous pressure [No Carotid Bruit] : no carotid bruit [Clear Lung Fields] : clear lung fields [Good Air Entry] : good air entry [No Respiratory Distress] : no respiratory distress  [Soft] : abdomen soft [Non Tender] : non-tender [No Masses/organomegaly] : no masses/organomegaly [Normal Bowel Sounds] : normal bowel sounds [Normal Gait] : normal gait [No Edema] : no edema [No Cyanosis] : no cyanosis [No Clubbing] : no clubbing [No Varicosities] : no varicosities [No Rash] : no rash [No Skin Lesions] : no skin lesions [Moves all extremities] : moves all extremities [No Focal Deficits] : no focal deficits [Normal Speech] : normal speech [Alert and Oriented] : alert and oriented [Normal memory] : normal memory [de-identified] : The patient is tachycardic, 2/6 systolic murmur, no appreciable rub

## 2024-05-28 ENCOUNTER — EMERGENCY (EMERGENCY)
Facility: HOSPITAL | Age: 24
LOS: 1 days | Discharge: HOME | End: 2024-05-28
Attending: EMERGENCY MEDICINE
Payer: COMMERCIAL

## 2024-05-28 VITALS
TEMPERATURE: 98 F | SYSTOLIC BLOOD PRESSURE: 138 MMHG | HEART RATE: 93 BPM | DIASTOLIC BLOOD PRESSURE: 80 MMHG | RESPIRATION RATE: 20 BRPM | OXYGEN SATURATION: 98 % | HEIGHT: 71 IN | WEIGHT: 202.83 LBS

## 2024-05-28 PROCEDURE — 99285 EMERGENCY DEPT VISIT HI MDM: CPT

## 2024-05-28 NOTE — ED ADULT TRIAGE NOTE - CHIEF COMPLAINT QUOTE
Patient presents to ED with c/o lower abdominal pain since this AM associated with constipation.  Last BM this AM.  No meds PTA

## 2024-05-29 LAB
ALBUMIN SERPL ELPH-MCNC: 4.5 G/DL — SIGNIFICANT CHANGE UP (ref 3.3–5.2)
ALP SERPL-CCNC: 123 U/L — HIGH (ref 40–120)
ALT FLD-CCNC: 94 U/L — HIGH
ANION GAP SERPL CALC-SCNC: 14 MMOL/L — SIGNIFICANT CHANGE UP (ref 5–17)
APPEARANCE UR: CLEAR — SIGNIFICANT CHANGE UP
AST SERPL-CCNC: 47 U/L — HIGH
BASOPHILS # BLD AUTO: 0.02 K/UL — SIGNIFICANT CHANGE UP (ref 0–0.2)
BASOPHILS NFR BLD AUTO: 0.2 % — SIGNIFICANT CHANGE UP (ref 0–2)
BILIRUB SERPL-MCNC: 0.3 MG/DL — LOW (ref 0.4–2)
BILIRUB UR-MCNC: NEGATIVE — SIGNIFICANT CHANGE UP
BUN SERPL-MCNC: 12.6 MG/DL — SIGNIFICANT CHANGE UP (ref 8–20)
CALCIUM SERPL-MCNC: 9.5 MG/DL — SIGNIFICANT CHANGE UP (ref 8.4–10.5)
CHLORIDE SERPL-SCNC: 100 MMOL/L — SIGNIFICANT CHANGE UP (ref 96–108)
CO2 SERPL-SCNC: 25 MMOL/L — SIGNIFICANT CHANGE UP (ref 22–29)
COLOR SPEC: YELLOW — SIGNIFICANT CHANGE UP
CREAT SERPL-MCNC: 0.75 MG/DL — SIGNIFICANT CHANGE UP (ref 0.5–1.3)
DIFF PNL FLD: NEGATIVE — SIGNIFICANT CHANGE UP
EGFR: 129 ML/MIN/1.73M2 — SIGNIFICANT CHANGE UP
EOSINOPHIL # BLD AUTO: 0.12 K/UL — SIGNIFICANT CHANGE UP (ref 0–0.5)
EOSINOPHIL NFR BLD AUTO: 1.3 % — SIGNIFICANT CHANGE UP (ref 0–6)
GLUCOSE SERPL-MCNC: 94 MG/DL — SIGNIFICANT CHANGE UP (ref 70–99)
GLUCOSE UR QL: NEGATIVE MG/DL — SIGNIFICANT CHANGE UP
HCT VFR BLD CALC: 43 % — SIGNIFICANT CHANGE UP (ref 39–50)
HGB BLD-MCNC: 14.9 G/DL — SIGNIFICANT CHANGE UP (ref 13–17)
IMM GRANULOCYTES NFR BLD AUTO: 0.2 % — SIGNIFICANT CHANGE UP (ref 0–0.9)
KETONES UR-MCNC: NEGATIVE MG/DL — SIGNIFICANT CHANGE UP
LEUKOCYTE ESTERASE UR-ACNC: NEGATIVE — SIGNIFICANT CHANGE UP
LIDOCAIN IGE QN: 42 U/L — SIGNIFICANT CHANGE UP (ref 22–51)
LYMPHOCYTES # BLD AUTO: 2.43 K/UL — SIGNIFICANT CHANGE UP (ref 1–3.3)
LYMPHOCYTES # BLD AUTO: 25.6 % — SIGNIFICANT CHANGE UP (ref 13–44)
MCHC RBC-ENTMCNC: 29.2 PG — SIGNIFICANT CHANGE UP (ref 27–34)
MCHC RBC-ENTMCNC: 34.7 GM/DL — SIGNIFICANT CHANGE UP (ref 32–36)
MCV RBC AUTO: 84.3 FL — SIGNIFICANT CHANGE UP (ref 80–100)
MONOCYTES # BLD AUTO: 0.79 K/UL — SIGNIFICANT CHANGE UP (ref 0–0.9)
MONOCYTES NFR BLD AUTO: 8.3 % — SIGNIFICANT CHANGE UP (ref 2–14)
NEUTROPHILS # BLD AUTO: 6.12 K/UL — SIGNIFICANT CHANGE UP (ref 1.8–7.4)
NEUTROPHILS NFR BLD AUTO: 64.4 % — SIGNIFICANT CHANGE UP (ref 43–77)
NITRITE UR-MCNC: NEGATIVE — SIGNIFICANT CHANGE UP
PH UR: 6.5 — SIGNIFICANT CHANGE UP (ref 5–8)
PLATELET # BLD AUTO: 300 K/UL — SIGNIFICANT CHANGE UP (ref 150–400)
POTASSIUM SERPL-MCNC: 4.5 MMOL/L — SIGNIFICANT CHANGE UP (ref 3.5–5.3)
POTASSIUM SERPL-SCNC: 4.5 MMOL/L — SIGNIFICANT CHANGE UP (ref 3.5–5.3)
PROT SERPL-MCNC: 7.4 G/DL — SIGNIFICANT CHANGE UP (ref 6.6–8.7)
PROT UR-MCNC: NEGATIVE MG/DL — SIGNIFICANT CHANGE UP
RBC # BLD: 5.1 M/UL — SIGNIFICANT CHANGE UP (ref 4.2–5.8)
RBC # FLD: 12.5 % — SIGNIFICANT CHANGE UP (ref 10.3–14.5)
SODIUM SERPL-SCNC: 139 MMOL/L — SIGNIFICANT CHANGE UP (ref 135–145)
SP GR SPEC: 1.01 — SIGNIFICANT CHANGE UP (ref 1–1.03)
UROBILINOGEN FLD QL: 0.2 MG/DL — SIGNIFICANT CHANGE UP (ref 0.2–1)
WBC # BLD: 9.5 K/UL — SIGNIFICANT CHANGE UP (ref 3.8–10.5)
WBC # FLD AUTO: 9.5 K/UL — SIGNIFICANT CHANGE UP (ref 3.8–10.5)

## 2024-05-29 PROCEDURE — 87086 URINE CULTURE/COLONY COUNT: CPT

## 2024-05-29 PROCEDURE — 85025 COMPLETE CBC W/AUTO DIFF WBC: CPT

## 2024-05-29 PROCEDURE — 36415 COLL VENOUS BLD VENIPUNCTURE: CPT

## 2024-05-29 PROCEDURE — 74177 CT ABD & PELVIS W/CONTRAST: CPT | Mod: 26,MC

## 2024-05-29 PROCEDURE — 81003 URINALYSIS AUTO W/O SCOPE: CPT

## 2024-05-29 PROCEDURE — 80053 COMPREHEN METABOLIC PANEL: CPT

## 2024-05-29 PROCEDURE — 96374 THER/PROPH/DIAG INJ IV PUSH: CPT

## 2024-05-29 PROCEDURE — 83690 ASSAY OF LIPASE: CPT

## 2024-05-29 PROCEDURE — 99284 EMERGENCY DEPT VISIT MOD MDM: CPT | Mod: 25

## 2024-05-29 PROCEDURE — 74177 CT ABD & PELVIS W/CONTRAST: CPT | Mod: MC

## 2024-05-29 RX ORDER — SODIUM CHLORIDE 9 MG/ML
1000 INJECTION INTRAMUSCULAR; INTRAVENOUS; SUBCUTANEOUS ONCE
Refills: 0 | Status: COMPLETED | OUTPATIENT
Start: 2024-05-29 | End: 2024-05-29

## 2024-05-29 RX ORDER — KETOROLAC TROMETHAMINE 30 MG/ML
30 SYRINGE (ML) INJECTION ONCE
Refills: 0 | Status: DISCONTINUED | OUTPATIENT
Start: 2024-05-29 | End: 2024-05-29

## 2024-05-29 RX ADMIN — Medication 30 MILLIGRAM(S): at 01:08

## 2024-05-29 RX ADMIN — SODIUM CHLORIDE 1000 MILLILITER(S): 9 INJECTION INTRAMUSCULAR; INTRAVENOUS; SUBCUTANEOUS at 01:09

## 2024-05-29 NOTE — ED PROVIDER NOTE - ED STEMI HIDDEN
Operative Note    Patient: Claire Hammond 35 year old female    MRN: [id number]    Surgeon(s): Leif Camarillo MD  Phone Number: [phone]                       Surgeon(s) and Role:     * Leif Camarillo MD - Primary    Assistant(s): yes  Pre-Op Diagnosis: hypermastia back and neck pain     Post-Op Diagnosis: same     Procedure: Procedure(s):  BILATERAL BREAST REDUCTION    Anesthesia Type: General                                   Complications: None    Description: same    Findings: same    Specimens Removed:   ID Type Source Tests Collected by Time   A : RIGHT BREAST TISSUE Tissue Breast, Right SURGICAL PATHOLOGY Leif Camarillo MD 12/14/2022 1416   B : LEFT BREAST TISSUE Tissue Breast, Left SURGICAL PATHOLOGY Leif Camarillo MD 12/14/2022 1416        Estimated Blood Loss: No Value 20cc     Assistant Tasks: Opening and closing, Dissecting tissue, Removing tissue and Altering tissue     Implants: * No implants in log *    I was present for the key portions of the procedure and was immediately available for the non-key portions           hide

## 2024-05-29 NOTE — ED PROVIDER NOTE - PHYSICAL EXAMINATION
Gen: No acute distress, non toxic  HEENT: Mucous membranes moist, pink conjunctivae, EOMI  CV: RRR, nl s1/s2.  Resp: CTAB, normal rate and effort  GI: Abdomen soft, +RLQ tenderness   : No CVAT  Neuro: A&O x 3, moving all 4 extremities  MSK: No spine or joint tenderness to palpation  Skin: No rashes. intact and perfused.

## 2024-05-29 NOTE — ED ADULT NURSE NOTE - OBJECTIVE STATEMENT
pt. is aaox4. states he woke up yesterday morning with abd pain. denies n/v/d. states his bm looks different than usual. iv access obtained, meds administered. safety maintained.

## 2024-05-29 NOTE — ED PROVIDER NOTE - PATIENT PORTAL LINK FT
You can access the FollowMyHealth Patient Portal offered by [facility] by registering at the following website: [email or web address]. By joining [employer]’s FollowMyHealth portal, you will also be able to view your health information using other applications (apps) compatible with our system.

## 2024-05-29 NOTE — ED PROVIDER NOTE - NSFOLLOWUPINSTRUCTIONS_ED_ALL_ED_FT
Please follow up with primary care doctor in 2-3 days  Follow up with your gastroenterologist  Return to ER for any new or worsening symptoms    Abdominal Pain    Many things can cause abdominal pain. Many times, abdominal pain is not caused by a disease and will improve without treatment. Your health care provider will do a physical exam to determine if there is a dangerous cause of your pain; blood tests and imaging may help determine the cause of your pain. However, in many cases, no cause may be found and you may need further testing as an outpatient. Monitor your abdominal pain for any changes.     SEEK IMMEDIATE MEDICAL CARE IF YOU HAVE ANY OF THE FOLLOWING SYMPTOMS: worsening abdominal pain, uncontrollable vomiting, profuse diarrhea, inability to have bowel movements or pass gas, black or bloody stools, fever accompanying chest pain or back pain, or fainting. These symptoms may represent a serious problem that is an emergency. Do not wait to see if the symptoms will go away. Get medical help right away. Call 911 and do not drive yourself to the hospital.

## 2024-05-29 NOTE — ED PROVIDER NOTE - CLINICAL SUMMARY MEDICAL DECISION MAKING FREE TEXT BOX
22 yo M hx gastritis, GERD presents c/o RLQ pain. States pain started suddenly in AM, constant since onset, pulling /cramping sensation, radiates across abdomen. Afebrile, +RLQ ttp on exam. Labs/CT no acute findings. Will dc with outpatient GI and PCP f/u. Return precautions.

## 2024-05-29 NOTE — ED PROVIDER NOTE - OBJECTIVE STATEMENT
24 yo M hx gastritis, GERD presents c/o RLQ pain. States pain started suddenly in AM, constant since onset, pulling /cramping sensation, radiates across abdomen. Notes "fluffy" stool last few mos. denies n/v/d, blood in stool, fevers.  has outpt GI doctor. next appt july. no past abd surgeries.

## 2024-05-29 NOTE — ED PROVIDER NOTE - ATTENDING APP SHARED VISIT CONTRIBUTION OF CARE
I, Neo Alan MD, performed the initial face to face bedside interview with this patient regarding history of present illness, review of symptoms and relevant past medical, social and family history.  I completed an independent physical examination.  I was the initial provider who evaluated this patient. I have signed out the follow up of any pending tests (i.e. labs, radiological studies) to the ACP.  I have communicated the patient’s plan of care and disposition with the ACP.

## 2024-05-30 LAB
CULTURE RESULTS: SIGNIFICANT CHANGE UP
SPECIMEN SOURCE: SIGNIFICANT CHANGE UP

## 2024-06-02 DIAGNOSIS — Z87.19 PERSONAL HISTORY OF OTHER DISEASES OF THE DIGESTIVE SYSTEM: ICD-10-CM

## 2024-06-02 DIAGNOSIS — K21.9 GASTRO-ESOPHAGEAL REFLUX DISEASE WITHOUT ESOPHAGITIS: ICD-10-CM

## 2024-06-02 DIAGNOSIS — R10.31 RIGHT LOWER QUADRANT PAIN: ICD-10-CM

## 2024-07-06 ENCOUNTER — NON-APPOINTMENT (OUTPATIENT)
Age: 24
End: 2024-07-06

## 2024-07-08 ENCOUNTER — APPOINTMENT (OUTPATIENT)
Dept: GASTROENTEROLOGY | Facility: CLINIC | Age: 24
End: 2024-07-08
Payer: MEDICAID

## 2024-07-08 VITALS
BODY MASS INDEX: 27.86 KG/M2 | HEART RATE: 70 BPM | WEIGHT: 199 LBS | HEIGHT: 71 IN | RESPIRATION RATE: 16 BRPM | SYSTOLIC BLOOD PRESSURE: 120 MMHG | OXYGEN SATURATION: 98 % | DIASTOLIC BLOOD PRESSURE: 70 MMHG

## 2024-07-08 DIAGNOSIS — K22.4 DYSKINESIA OF ESOPHAGUS: ICD-10-CM

## 2024-07-08 DIAGNOSIS — K58.2 MIXED IRRITABLE BOWEL SYNDROME: ICD-10-CM

## 2024-07-08 PROCEDURE — 99214 OFFICE O/P EST MOD 30 MIN: CPT

## 2024-07-08 PROCEDURE — G2211 COMPLEX E/M VISIT ADD ON: CPT | Mod: NC

## 2024-07-26 ENCOUNTER — LABORATORY RESULT (OUTPATIENT)
Age: 24
End: 2024-07-26

## 2024-08-01 ENCOUNTER — RESULT REVIEW (OUTPATIENT)
Age: 24
End: 2024-08-01

## 2024-08-01 ENCOUNTER — APPOINTMENT (OUTPATIENT)
Dept: GASTROENTEROLOGY | Facility: HOSPITAL | Age: 24
End: 2024-08-01

## 2024-08-01 ENCOUNTER — OUTPATIENT (OUTPATIENT)
Dept: OUTPATIENT SERVICES | Facility: HOSPITAL | Age: 24
LOS: 1 days | End: 2024-08-01
Payer: COMMERCIAL

## 2024-08-01 DIAGNOSIS — R10.84 GENERALIZED ABDOMINAL PAIN: ICD-10-CM

## 2024-08-01 DIAGNOSIS — K22.4 DYSKINESIA OF ESOPHAGUS: ICD-10-CM

## 2024-08-01 PROCEDURE — 88305 TISSUE EXAM BY PATHOLOGIST: CPT

## 2024-08-01 PROCEDURE — 43239 EGD BIOPSY SINGLE/MULTIPLE: CPT

## 2024-08-01 PROCEDURE — C9399: CPT

## 2024-08-01 PROCEDURE — 88305 TISSUE EXAM BY PATHOLOGIST: CPT | Mod: 26

## 2024-08-01 PROCEDURE — 91040 ESOPH BALLOON DISTENSION TST: CPT | Mod: 26,59

## 2024-08-01 PROCEDURE — 88342 IMHCHEM/IMCYTCHM 1ST ANTB: CPT | Mod: 26

## 2024-08-01 PROCEDURE — 88342 IMHCHEM/IMCYTCHM 1ST ANTB: CPT

## 2024-08-01 PROCEDURE — C1889: CPT

## 2024-08-01 PROCEDURE — 43241 EGD TUBE/CATH INSERTION: CPT | Mod: 59

## 2024-08-01 DEVICE — CATH ENDOFLIP MEASURE 16MM: Type: IMPLANTABLE DEVICE | Status: FUNCTIONAL

## 2024-08-01 NOTE — PHYSICAL EXAM
[Alert] : alert [Normal Voice/Communication] : normal voice/communication [Healthy Appearing] : healthy appearing [No Acute Distress] : no acute distress [Sclera] : the sclera and conjunctiva were normal [Hearing Threshold Finger Rub Not [place]] : hearing was normal [Normal Lips/Gums] : the lips and gums were normal [Oropharynx] : the oropharynx was normal [Normal Appearance] : the appearance of the neck was normal [No Neck Mass] : no neck mass was observed [No Respiratory Distress] : no respiratory distress [No Acc Muscle Use] : no accessory muscle use [Respiration, Rhythm And Depth] : normal respiratory rhythm and effort [Auscultation Breath Sounds / Voice Sounds] : lungs were clear to auscultation bilaterally [Heart Rate And Rhythm] : heart rate was normal and rhythm regular [Normal S1, S2] : normal S1 and S2 [Murmurs] : no murmurs [Bowel Sounds] : normal bowel sounds [Abdomen Tenderness] : non-tender [No Masses] : no abdominal mass palpated [Abdomen Soft] : soft [] : no hepatosplenomegaly [Oriented To Time, Place, And Person] : oriented to person, place, and time

## 2024-08-05 ENCOUNTER — APPOINTMENT (OUTPATIENT)
Dept: MRI IMAGING | Facility: CLINIC | Age: 24
End: 2024-08-05

## 2024-08-05 LAB — SURGICAL PATHOLOGY STUDY: SIGNIFICANT CHANGE UP

## 2024-08-10 ENCOUNTER — APPOINTMENT (OUTPATIENT)
Dept: MRI IMAGING | Facility: CLINIC | Age: 24
End: 2024-08-10

## 2024-08-10 ENCOUNTER — OUTPATIENT (OUTPATIENT)
Dept: OUTPATIENT SERVICES | Facility: HOSPITAL | Age: 24
LOS: 1 days | End: 2024-08-10
Payer: MEDICAID

## 2024-08-10 DIAGNOSIS — I67.1 CEREBRAL ANEURYSM, NONRUPTURED: ICD-10-CM

## 2024-08-10 DIAGNOSIS — Z00.8 ENCOUNTER FOR OTHER GENERAL EXAMINATION: ICD-10-CM

## 2024-08-10 PROCEDURE — 70544 MR ANGIOGRAPHY HEAD W/O DYE: CPT | Mod: 26

## 2024-08-10 PROCEDURE — 70544 MR ANGIOGRAPHY HEAD W/O DYE: CPT

## 2024-08-16 NOTE — ED ADULT TRIAGE NOTE - SOURCE OF INFORMATION
[employer] for [employer] referral placed for Wu Anderson.  
Mom wants to know what she needs to do to be qualified for food voucher   
Patient

## 2024-08-16 NOTE — ED PROVIDER NOTE - CROS ED ROS STATEMENT
Hypertension is stable and controlled  Continue current treatment regimen.  Dietary sodium restriction.  Regular aerobic exercise.  Ambulatory blood pressure monitoring.  Blood pressure will be reassessed in 6 months.   all other ROS negative except as per HPI

## 2024-08-22 ENCOUNTER — APPOINTMENT (OUTPATIENT)
Dept: CARDIOLOGY | Facility: CLINIC | Age: 24
End: 2024-08-22
Payer: MEDICAID

## 2024-08-22 VITALS
SYSTOLIC BLOOD PRESSURE: 118 MMHG | OXYGEN SATURATION: 97 % | BODY MASS INDEX: 29.54 KG/M2 | HEIGHT: 71 IN | DIASTOLIC BLOOD PRESSURE: 76 MMHG | WEIGHT: 211 LBS | HEART RATE: 109 BPM

## 2024-08-22 DIAGNOSIS — R00.0 TACHYCARDIA, UNSPECIFIED: ICD-10-CM

## 2024-08-22 DIAGNOSIS — R42 DIZZINESS AND GIDDINESS: ICD-10-CM

## 2024-08-22 DIAGNOSIS — I31.9 DISEASE OF PERICARDIUM, UNSPECIFIED: ICD-10-CM

## 2024-08-22 PROCEDURE — 93000 ELECTROCARDIOGRAM COMPLETE: CPT

## 2024-08-22 PROCEDURE — 99214 OFFICE O/P EST MOD 30 MIN: CPT | Mod: 25

## 2024-08-22 NOTE — PHYSICAL EXAM
[Well Developed] : well developed [Well Nourished] : well nourished [No Acute Distress] : no acute distress [Normal Conjunctiva] : normal conjunctiva [Normal Venous Pressure] : normal venous pressure [No Carotid Bruit] : no carotid bruit [Clear Lung Fields] : clear lung fields [Good Air Entry] : good air entry [No Respiratory Distress] : no respiratory distress  [Soft] : abdomen soft [Non Tender] : non-tender [No Masses/organomegaly] : no masses/organomegaly [Normal Bowel Sounds] : normal bowel sounds [Normal Gait] : normal gait [No Edema] : no edema [No Cyanosis] : no cyanosis [No Clubbing] : no clubbing [No Varicosities] : no varicosities [No Rash] : no rash [No Skin Lesions] : no skin lesions [Moves all extremities] : moves all extremities [No Focal Deficits] : no focal deficits [Normal Speech] : normal speech [Alert and Oriented] : alert and oriented [Normal memory] : normal memory [Normal S1, S2] : normal S1, S2 [No Rub] : no rub [Murmur] : murmur [de-identified] : 1/6 systolic murmur

## 2024-08-22 NOTE — REASON FOR VISIT
February 7, 2024      Tonya Griffin  [id number] [address] [facility] [address]        To Whom It May Concern:    Tonya Griffin was seen in our clinic. She may return to school without restrictions.      Sincerely,        Yun Orozco MD          
[FreeTextEntry1] : Chief complaint: Tachycardia, dizziness

## 2024-08-22 NOTE — HISTORY OF PRESENT ILLNESS
[FreeTextEntry1] : The patient is a 23-year-old male with a past medical history remarkable for pericarditis, reported nonalcoholic fatty liver, gastritis, and a cerebral aneurysm who presented for evaluation of tachycardia and dizziness. Echocardiography demonstrated a normal ejection fraction and the absence of valve disease.  6 days of Holter monitoring demonstrated normal sinus rhythm with an average heart rate of 73 bpm.  16 symptoms corresponded to sinus rhythm The patient was instructed to increase his water intake.  His tachycardia and dizziness have resolved.

## 2024-08-22 NOTE — CARDIOLOGY SUMMARY
[de-identified] : Normal sinus rhythm, increased RS ratio V2, no significant change from prior [de-identified] : - Nonalcoholic fatty liver - Cerebral aneurysm: Angiogram pending -Gastritis -Pericarditis   4/2024 - ECHOCARDIOGRAM: 5/10/2024, EF 60%, trace mitral and tricuspid regurgitation, PA sys24 -Abnormal ECG 4/1/2024,  Suggestive of pericarditis -HOLTER MONITOR: 4/22/2024, 6-day, NSR 73 (), rare APCs, no PVCs, 16 sxms =SR

## 2024-08-22 NOTE — DISCUSSION/SUMMARY
[FreeTextEntry1] : Tachycardia/dizziness Resolved. Continue increased water intake  Increase aerobic activity   Chest pain Improved. Probably secondary to resolved pericarditis   RTO 1 year [EKG obtained to assist in diagnosis and management of assessed problem(s)] : EKG obtained to assist in diagnosis and management of assessed problem(s)

## 2024-08-27 ENCOUNTER — APPOINTMENT (OUTPATIENT)
Dept: NEUROLOGY | Facility: CLINIC | Age: 24
End: 2024-08-27
Payer: MEDICAID

## 2024-08-27 VITALS
DIASTOLIC BLOOD PRESSURE: 80 MMHG | WEIGHT: 200 LBS | BODY MASS INDEX: 28 KG/M2 | HEART RATE: 116 BPM | HEIGHT: 71 IN | OXYGEN SATURATION: 98 % | SYSTOLIC BLOOD PRESSURE: 137 MMHG

## 2024-08-27 DIAGNOSIS — I67.1 CEREBRAL ANEURYSM, NONRUPTURED: ICD-10-CM

## 2024-08-27 PROCEDURE — 99215 OFFICE O/P EST HI 40 MIN: CPT

## 2024-08-27 PROCEDURE — G2211 COMPLEX E/M VISIT ADD ON: CPT | Mod: NC

## 2024-08-27 NOTE — HISTORY OF PRESENT ILLNESS
[FreeTextEntry1] : Mr. 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 MRA head 8/10/24 revealed stability of the left ophthalmic aneurysm.  He has a similar sized right ophthalmic artery aneurysm as well. He underwent b/l lower wisdom tooth extraction and is doing well. Headaches are mild (4/10), occurring about 2x/week, resolving on its own without medication. No new neurological symptoms.  Patient has recent diagnosis of possible celiac disease.  He has a lot of GI symptoms and esophagitis.

## 2024-08-27 NOTE — PHYSICAL EXAM
[FreeTextEntry1] :  GENERAL APPEARANCE: Well developed, well-nourished man in no acute distress.  NEUROLOGIC EXAM:  MENTAL STATUS: Alert and Oriented to person, place and time. Speech is fluent, without aphasia or dysarthria Behavior and affect appropriate to situation.                         CRANIAL NERVES: CN 2:    Visual fields are full to confrontation. CN 3, 4, 6: Extraocular movements are intact. No nystagmus or ophthalmoplegia is evident. Pupils are equally round and reactive to light. CN 5:     Facial sensation is intact to light touch in all 3 divisions CN 7:     Facial excursion is full and symmetric bilaterally.  MOTOR: Strength is 5/5 throughout for age and stature. No orbiting or drift noted.  SENSORY: Intact to light touch and perception in all four extremities without extinction to double simultaneous stimulation  COORD: Finger to nose testing without dysmetria bilaterally.  GAIT: Normal station and gait. Negative Romberg

## 2024-08-27 NOTE — ED ADULT NURSE NOTE - BEFAST BALANCE
Attempted to call patient to reschedule appointment that was cancelled with Dr. Early on 9/6/24, no answer and left message with Hopeline's #.  
No

## 2024-08-27 NOTE — DISCUSSION/SUMMARY
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  I have personally reviewed available neuroradiological images. MRA brain 8/10/2024- 3 mm left ophthalmic region superomedially directed aneurysm just proximal to the origin of the left ophthalmic artery. I also appreciate a similar sized right ophthalmic artery aneurysm.   Given patient's young age, limited evaluation and suboptimal images obtained from CTA and MRA, diagnostic cerebral angiography was offered for further evaluation and for potential treatment recommendations.  Patient has decided to hold off at this time on diagnostic cerebral angiogram as he would like to have his gastrointestinal issues sorted out first if possible.  I recommended continued surveillance of the aneurysms at minimum yearly with MRA.  Patient was educated to seek emergent medical attention if he developed any acute severe headache that was atypical or worst headache of life with or without neurological symptoms of stroke.  PLAN: 1. Normotension 2. He will let us know if he would like to proceed with diagnostic angiogram, if not will plan for conservative management with repeat MRA brain in 1 year (8/2024).  3.  Avoidance of any tobacco, significant alcohol or any vasoactive illicit drug intake.   Follow up in 6 months or sooner if needed. All of the patient's questions and concerns were addressed.

## 2024-09-10 ENCOUNTER — NON-APPOINTMENT (OUTPATIENT)
Age: 24
End: 2024-09-10

## 2024-09-10 ENCOUNTER — TRANSCRIPTION ENCOUNTER (OUTPATIENT)
Age: 24
End: 2024-09-10

## 2024-09-28 ENCOUNTER — EMERGENCY (EMERGENCY)
Facility: HOSPITAL | Age: 24
LOS: 1 days | Discharge: HOME | End: 2024-09-28
Attending: EMERGENCY MEDICINE
Payer: COMMERCIAL

## 2024-09-28 VITALS
OXYGEN SATURATION: 98 % | WEIGHT: 190.04 LBS | DIASTOLIC BLOOD PRESSURE: 80 MMHG | RESPIRATION RATE: 18 BRPM | HEART RATE: 125 BPM | SYSTOLIC BLOOD PRESSURE: 134 MMHG | HEIGHT: 71 IN | TEMPERATURE: 98 F

## 2024-09-28 VITALS — HEART RATE: 90 BPM

## 2024-09-28 PROCEDURE — 73610 X-RAY EXAM OF ANKLE: CPT

## 2024-09-28 PROCEDURE — 73610 X-RAY EXAM OF ANKLE: CPT | Mod: 26,LT

## 2024-09-28 PROCEDURE — 73630 X-RAY EXAM OF FOOT: CPT | Mod: 26,LT

## 2024-09-28 PROCEDURE — 99284 EMERGENCY DEPT VISIT MOD MDM: CPT

## 2024-09-28 PROCEDURE — 73630 X-RAY EXAM OF FOOT: CPT

## 2024-09-28 RX ORDER — OXYCODONE HYDROCHLORIDE 30 MG/1
5 TABLET ORAL ONCE
Refills: 0 | Status: DISCONTINUED | OUTPATIENT
Start: 2024-09-28 | End: 2024-09-28

## 2024-09-28 RX ADMIN — OXYCODONE HYDROCHLORIDE 5 MILLIGRAM(S): 30 TABLET ORAL at 18:22

## 2024-10-10 ENCOUNTER — APPOINTMENT (OUTPATIENT)
Dept: ORTHOPEDIC SURGERY | Facility: CLINIC | Age: 24
End: 2024-10-10
Payer: MEDICAID

## 2024-10-10 VITALS
BODY MASS INDEX: 26.6 KG/M2 | WEIGHT: 190 LBS | HEIGHT: 71 IN | DIASTOLIC BLOOD PRESSURE: 83 MMHG | SYSTOLIC BLOOD PRESSURE: 124 MMHG | HEART RATE: 116 BPM

## 2024-10-10 DIAGNOSIS — Z78.9 OTHER SPECIFIED HEALTH STATUS: ICD-10-CM

## 2024-10-10 DIAGNOSIS — S93.492A SPRAIN OF OTHER LIGAMENT OF LEFT ANKLE, INITIAL ENCOUNTER: ICD-10-CM

## 2024-10-10 PROCEDURE — 99203 OFFICE O/P NEW LOW 30 MIN: CPT

## 2024-11-06 ENCOUNTER — APPOINTMENT (OUTPATIENT)
Dept: GASTROENTEROLOGY | Facility: CLINIC | Age: 24
End: 2024-11-06
Payer: MEDICAID

## 2024-11-06 VITALS
SYSTOLIC BLOOD PRESSURE: 130 MMHG | RESPIRATION RATE: 16 BRPM | WEIGHT: 190 LBS | BODY MASS INDEX: 26.6 KG/M2 | HEART RATE: 120 BPM | DIASTOLIC BLOOD PRESSURE: 80 MMHG | HEIGHT: 71 IN | OXYGEN SATURATION: 98 %

## 2024-11-06 DIAGNOSIS — K22.4 DYSKINESIA OF ESOPHAGUS: ICD-10-CM

## 2024-11-06 DIAGNOSIS — K21.00 GASTRO-ESOPHAGEAL REFLUX DISEASE WITH ESOPHAGITIS, WITHOUT BLEEDING: ICD-10-CM

## 2024-11-06 PROCEDURE — G2211 COMPLEX E/M VISIT ADD ON: CPT | Mod: NC

## 2024-11-06 PROCEDURE — 99214 OFFICE O/P EST MOD 30 MIN: CPT

## 2024-11-06 RX ORDER — VONOPRAZAN FUMARATE 26.72 MG/1
20 TABLET ORAL
Qty: 30 | Refills: 1 | Status: ACTIVE | COMMUNITY
Start: 2024-11-06 | End: 1900-01-01

## 2024-11-25 ENCOUNTER — NON-APPOINTMENT (OUTPATIENT)
Age: 24
End: 2024-11-25

## 2024-11-25 ENCOUNTER — TRANSCRIPTION ENCOUNTER (OUTPATIENT)
Age: 24
End: 2024-11-25

## 2024-12-21 ENCOUNTER — EMERGENCY (EMERGENCY)
Facility: HOSPITAL | Age: 24
LOS: 1 days | End: 2024-12-21
Attending: STUDENT IN AN ORGANIZED HEALTH CARE EDUCATION/TRAINING PROGRAM
Payer: COMMERCIAL

## 2024-12-21 VITALS
SYSTOLIC BLOOD PRESSURE: 107 MMHG | HEART RATE: 85 BPM | DIASTOLIC BLOOD PRESSURE: 84 MMHG | RESPIRATION RATE: 19 BRPM | OXYGEN SATURATION: 98 %

## 2024-12-21 VITALS
TEMPERATURE: 98 F | RESPIRATION RATE: 18 BRPM | SYSTOLIC BLOOD PRESSURE: 140 MMHG | HEIGHT: 71 IN | HEART RATE: 151 BPM | OXYGEN SATURATION: 98 % | WEIGHT: 225.97 LBS | DIASTOLIC BLOOD PRESSURE: 76 MMHG

## 2024-12-21 LAB
ALBUMIN SERPL ELPH-MCNC: 4.3 G/DL — SIGNIFICANT CHANGE UP (ref 3.3–5.2)
ALP SERPL-CCNC: 117 U/L — SIGNIFICANT CHANGE UP (ref 40–120)
ALT FLD-CCNC: 95 U/L — HIGH
ANION GAP SERPL CALC-SCNC: 12 MMOL/L — SIGNIFICANT CHANGE UP (ref 5–17)
AST SERPL-CCNC: 42 U/L — HIGH
BASOPHILS # BLD AUTO: 0.02 K/UL — SIGNIFICANT CHANGE UP (ref 0–0.2)
BASOPHILS NFR BLD AUTO: 0.2 % — SIGNIFICANT CHANGE UP (ref 0–2)
BILIRUB SERPL-MCNC: 0.2 MG/DL — LOW (ref 0.4–2)
BUN SERPL-MCNC: 11.7 MG/DL — SIGNIFICANT CHANGE UP (ref 8–20)
CALCIUM SERPL-MCNC: 9.2 MG/DL — SIGNIFICANT CHANGE UP (ref 8.4–10.5)
CHLORIDE SERPL-SCNC: 102 MMOL/L — SIGNIFICANT CHANGE UP (ref 96–108)
CO2 SERPL-SCNC: 26 MMOL/L — SIGNIFICANT CHANGE UP (ref 22–29)
CREAT SERPL-MCNC: 0.72 MG/DL — SIGNIFICANT CHANGE UP (ref 0.5–1.3)
EGFR: 130 ML/MIN/1.73M2 — SIGNIFICANT CHANGE UP
EOSINOPHIL # BLD AUTO: 0.09 K/UL — SIGNIFICANT CHANGE UP (ref 0–0.5)
EOSINOPHIL NFR BLD AUTO: 1 % — SIGNIFICANT CHANGE UP (ref 0–6)
GLUCOSE SERPL-MCNC: 104 MG/DL — HIGH (ref 70–99)
HCT VFR BLD CALC: 40.6 % — SIGNIFICANT CHANGE UP (ref 39–50)
HGB BLD-MCNC: 14 G/DL — SIGNIFICANT CHANGE UP (ref 13–17)
IMM GRANULOCYTES NFR BLD AUTO: 0.4 % — SIGNIFICANT CHANGE UP (ref 0–0.9)
LYMPHOCYTES # BLD AUTO: 1.38 K/UL — SIGNIFICANT CHANGE UP (ref 1–3.3)
LYMPHOCYTES # BLD AUTO: 15.2 % — SIGNIFICANT CHANGE UP (ref 13–44)
MCHC RBC-ENTMCNC: 28.2 PG — SIGNIFICANT CHANGE UP (ref 27–34)
MCHC RBC-ENTMCNC: 34.5 G/DL — SIGNIFICANT CHANGE UP (ref 32–36)
MCV RBC AUTO: 81.9 FL — SIGNIFICANT CHANGE UP (ref 80–100)
MONOCYTES # BLD AUTO: 0.79 K/UL — SIGNIFICANT CHANGE UP (ref 0–0.9)
MONOCYTES NFR BLD AUTO: 8.7 % — SIGNIFICANT CHANGE UP (ref 2–14)
NEUTROPHILS # BLD AUTO: 6.76 K/UL — SIGNIFICANT CHANGE UP (ref 1.8–7.4)
NEUTROPHILS NFR BLD AUTO: 74.5 % — SIGNIFICANT CHANGE UP (ref 43–77)
PLATELET # BLD AUTO: 277 K/UL — SIGNIFICANT CHANGE UP (ref 150–400)
POTASSIUM SERPL-MCNC: 3.9 MMOL/L — SIGNIFICANT CHANGE UP (ref 3.5–5.3)
POTASSIUM SERPL-SCNC: 3.9 MMOL/L — SIGNIFICANT CHANGE UP (ref 3.5–5.3)
PROT SERPL-MCNC: 7.2 G/DL — SIGNIFICANT CHANGE UP (ref 6.6–8.7)
RBC # BLD: 4.96 M/UL — SIGNIFICANT CHANGE UP (ref 4.2–5.8)
RBC # FLD: 12 % — SIGNIFICANT CHANGE UP (ref 10.3–14.5)
SODIUM SERPL-SCNC: 139 MMOL/L — SIGNIFICANT CHANGE UP (ref 135–145)
TROPONIN T, HIGH SENSITIVITY RESULT: <6 NG/L — SIGNIFICANT CHANGE UP (ref 0–51)
WBC # BLD: 9.08 K/UL — SIGNIFICANT CHANGE UP (ref 3.8–10.5)
WBC # FLD AUTO: 9.08 K/UL — SIGNIFICANT CHANGE UP (ref 3.8–10.5)

## 2024-12-21 PROCEDURE — 93010 ELECTROCARDIOGRAM REPORT: CPT

## 2024-12-21 PROCEDURE — 85025 COMPLETE CBC W/AUTO DIFF WBC: CPT

## 2024-12-21 PROCEDURE — 71045 X-RAY EXAM CHEST 1 VIEW: CPT

## 2024-12-21 PROCEDURE — 36415 COLL VENOUS BLD VENIPUNCTURE: CPT

## 2024-12-21 PROCEDURE — 70498 CT ANGIOGRAPHY NECK: CPT | Mod: 26,MC

## 2024-12-21 PROCEDURE — 70496 CT ANGIOGRAPHY HEAD: CPT | Mod: 26,MC

## 2024-12-21 PROCEDURE — 84484 ASSAY OF TROPONIN QUANT: CPT

## 2024-12-21 PROCEDURE — 71045 X-RAY EXAM CHEST 1 VIEW: CPT | Mod: 26

## 2024-12-21 PROCEDURE — 99285 EMERGENCY DEPT VISIT HI MDM: CPT | Mod: 25

## 2024-12-21 PROCEDURE — 80053 COMPREHEN METABOLIC PANEL: CPT

## 2024-12-21 PROCEDURE — 70496 CT ANGIOGRAPHY HEAD: CPT | Mod: MC

## 2024-12-21 PROCEDURE — 70450 CT HEAD/BRAIN W/O DYE: CPT | Mod: MC

## 2024-12-21 PROCEDURE — 70498 CT ANGIOGRAPHY NECK: CPT | Mod: MC

## 2024-12-21 PROCEDURE — 70450 CT HEAD/BRAIN W/O DYE: CPT | Mod: 26,MC,59

## 2024-12-21 PROCEDURE — 93005 ELECTROCARDIOGRAM TRACING: CPT

## 2024-12-21 NOTE — ED ADULT NURSE NOTE - CHIEF COMPLAINT QUOTE
pt walk in c/o dizziness x4 days and low bp at home, unsure of value and new tinnitus tonight. Pt states has two small brain aneurysms currently, BEFAST negative. Pt noted tachy to 151 EKG obtained

## 2024-12-21 NOTE — ED PROVIDER NOTE - PATIENT PORTAL LINK FT
You can access the FollowMyHealth Patient Portal offered by [facility] by registering at the following website: [email or web address]. By joining [employer]’s FollowMyHealth portal, you will also be able to view your health information using other applications (apps) compatible with our system.

## 2024-12-21 NOTE — ED ADULT NURSE NOTE - OBJECTIVE STATEMENT
Patient presented to ED s/p feeling dizziness and low blood pressure. Patient states he take this medication called Voquezna for gastritis and since then he has had low blood pressure, and dizziness. Patient states he has  2 small  brain aneurysms. Patient AXOX4, RR even and unlabored, CM NSR. NAD noted.

## 2024-12-21 NOTE — ED ADULT NURSE REASSESSMENT NOTE - NS ED NURSE REASSESS COMMENT FT1
Assumed care of pt at 0720 from Calvin MERCER. Pt sitting up in bed with cardiac monitor in place and NSR noted on monitor. Respirations are even and unlabored. Pt A&Ox3. NAD.
pt placed on cardiac monitor NSR HR 86 at this time, pt denies chest pain headache or vision changes

## 2024-12-21 NOTE — ED PROVIDER NOTE - PROGRESS NOTE DETAILS
Cecy Felipe MD, Attending  Pt reassessed, states he feels at his baseline. Pt updated of CT and lab results.    Pt encouraged to follow up with prescribing MD. Pt agrees to oupt followup and will ask parents to pick him up.

## 2024-12-21 NOTE — ED PROVIDER NOTE - CLINICAL SUMMARY MEDICAL DECISION MAKING FREE TEXT BOX
Cecy Felipe MD, Attending  25 yo M hx of aneurysm pw dizziness x 4 days tinnitus tonight. dizziness present when changing vertical positions, and "hypotension" though he does not remember pressure readings. Pt has nystagmus or focal deficits noted. Pt with very anxious affect and sinus tachycardia, repeatedly asks why he has tinnitus, and "hypotension".   Gen: Well appearing in NAD   Head: NC/AT, no nystagmus. EOMI, dry MM  Neck: trachea midline  Resp:  No distress  card: + sinus tachycardia, S1. S2, normotensive  Ext: no deformities  Neuro:  A&O appears non focal  Skin:  Warm and dry as visualized  Psych: very anxious affect  ddx includes, but is not limited to the following: ICH given hx of on aneurysm, tinnitus, orthostatic hypotension, anxiety.  plan: CT non contrast and with contrast, screening labs, reassess.   update: see progress notes

## 2025-01-02 ENCOUNTER — APPOINTMENT (OUTPATIENT)
Dept: GASTROENTEROLOGY | Facility: CLINIC | Age: 25
End: 2025-01-02
Payer: MEDICAID

## 2025-01-02 VITALS
DIASTOLIC BLOOD PRESSURE: 82 MMHG | HEIGHT: 71 IN | WEIGHT: 232 LBS | HEART RATE: 90 BPM | TEMPERATURE: 97.6 F | RESPIRATION RATE: 14 BRPM | OXYGEN SATURATION: 97 % | SYSTOLIC BLOOD PRESSURE: 132 MMHG | BODY MASS INDEX: 32.48 KG/M2

## 2025-01-02 DIAGNOSIS — K21.00 GASTRO-ESOPHAGEAL REFLUX DISEASE WITH ESOPHAGITIS, WITHOUT BLEEDING: ICD-10-CM

## 2025-01-02 DIAGNOSIS — K29.00 ACUTE GASTRITIS W/OUT BLEEDING: ICD-10-CM

## 2025-01-02 DIAGNOSIS — K59.00 CONSTIPATION, UNSPECIFIED: ICD-10-CM

## 2025-01-02 DIAGNOSIS — Z71.9 COUNSELING, UNSPECIFIED: ICD-10-CM

## 2025-01-02 PROCEDURE — 99213 OFFICE O/P EST LOW 20 MIN: CPT

## 2025-01-15 ENCOUNTER — APPOINTMENT (OUTPATIENT)
Dept: ORTHOPEDIC SURGERY | Facility: CLINIC | Age: 25
End: 2025-01-15
Payer: MEDICAID

## 2025-01-15 VITALS
WEIGHT: 231 LBS | HEART RATE: 108 BPM | BODY MASS INDEX: 32.34 KG/M2 | DIASTOLIC BLOOD PRESSURE: 81 MMHG | HEIGHT: 71 IN | SYSTOLIC BLOOD PRESSURE: 142 MMHG

## 2025-01-15 VITALS — WEIGHT: 232 LBS | BODY MASS INDEX: 32.48 KG/M2 | HEIGHT: 71 IN

## 2025-01-15 DIAGNOSIS — S93.492A SPRAIN OF OTHER LIGAMENT OF LEFT ANKLE, INITIAL ENCOUNTER: ICD-10-CM

## 2025-01-15 PROCEDURE — 99213 OFFICE O/P EST LOW 20 MIN: CPT

## 2025-01-27 NOTE — ED ADULT TRIAGE NOTE - GLASGOW COMA SCALE: SCORE, MLM
Patient : Jaylyn Boland Age: 24 year old Sex: female   MRN: [id number] Encounter Date: 1/27/2025      History      Chief Complaint   Patient presents with    Flank Pain            HPI    Jaylyn Boland is a 24 year old presenting to the emergency department complaining of epigastric pain that began 3 days ago. Pt also reports pain radiates straight through to the central-mid back, nausea, and then vomiting today. She says her pain markedly worsened today. She was seen in the ED 3 days ago for this. Pt had been controlling her pain with ibuprofen but she does not feel this is sufficient anymore. Pt has not had diarrhea or bloody stools. She reports that her only pertinent medical history is asthma. Pt denies taking any daily medications. She denies any injuries or falls lately. Denies chance of pregnancy. She is concerned for kidney stones.     I have reviewed Jaylyn Boland's previous radiology note from CT abdomen pelvis from 12/29/23 .  Note Review Summary: CT found gallstones.      Past/Family/Social History     Allergies   Allergen Reactions    Bee Sting SWELLING       Current Facility-Administered Medications   Medication    copper (PARAGARD) intrauterine device 1 each     Current Outpatient Medications   Medication Sig    ondansetron (ZOFRAN ODT) 4 MG disintegrating tablet Place 1 tablet onto the tongue every 6 hours as needed for Nausea.    dicyclomine (BENTYL) 10 MG capsule Take 1 capsule by mouth 3 times daily as needed (abd pain).    albuterol 108 (90 Base) MCG/ACT inhaler Inhale 2 puffs into the lungs every 4 hours as needed for Other (shortness of breath, cough, and chest tightness).    benzonatate (TESSALON PERLES) 200 MG capsule Take 1 capsule by mouth 3 times daily as needed for Cough.    metroNIDAZOLE (METROGEL-VAGINAL) 0.75 % vaginal gel 1 application vaginally at night x 5 days (Patient not taking: Reported on 12/30/2024)    ARIPiprazole (ABILIFY) 5 MG tablet Take 1 tablet by mouth daily. (Patient  not taking: Reported on 12/30/2024)    QUEtiapine (SEROquel) 50 MG tablet Take 0.5-1 tablets by mouth at bedtime as needed (insomnia). (Patient not taking: Reported on 11/7/2024)    hydrOXYzine (ATARAX) 10 MG tablet Take 1 tablet by mouth 2 times daily as needed for Anxiety. (Patient not taking: Reported on 11/7/2024)    ibuprofen (MOTRIN) 600 MG tablet Take 1 tablet by mouth every 6 hours as needed for Pain. (Patient not taking: Reported on 12/30/2024)    fluticasone (FLONASE) 50 MCG/ACT nasal spray Spray 1 spray in each nostril in the morning and 1 spray in the evening. Do all this for 14 days. (Patient not taking: Reported on 12/30/2024)    nicotine polacrilex (NICORETTE) 2 MG gum Take 1 each by mouth as needed for Smoking cessation. (Patient not taking: Reported on 8/2/2024)       Past Medical History:   Diagnosis Date    Anxiety     Depression     Miscarriage (CMD) 02/2019    coming for D&C    NO HX OF     Ca, Htn, DM, CAD, CVA, DVT, liver disease or migraine    RAD (reactive airway disease) (CMD) March 2016    Rescue inhaler once a week       Past Surgical History:   Procedure Laterality Date    D and c  2019    missed AB    Intrauterine copper contraceptive  08/22/2024       Family History   Problem Relation Age of Onset    Patient is unaware of any medical problems Mother     Patient is unaware of any medical problems Father     Patient is unaware of any medical problems Sister     Patient is unaware of any medical problems Brother     Patient is unaware of any medical problems Brother     Heart Maternal Grandmother     Patient is unaware of any medical problems Paternal Grandmother     Patient is unaware of any medical problems Paternal Grandfather     Other Other         2020- neg ov, ut or colon ca    Cancer, Breast Other     Cancer Neg Hx         2024 neg fam hx uterine, ovarian and colon   Gallstones in mom and sister per the patient    Social History     Tobacco Use    Smoking status: Never     Smokeless tobacco: Never   Vaping Use    Vaping status: never used   Substance Use Topics    Alcohol use: Yes     Comment: occ    Drug use: Not Currently     Frequency: 7.0 times per week     Comment: daily for anxiety for about one year. Stopped August 2019          Review of Systems   Review of Symptoms     Review of Systems   Constitutional: Negative.  Negative for appetite change, chills, fatigue and fever.   HENT: Negative.  Negative for congestion, rhinorrhea and sore throat.    Eyes: Negative.  Negative for discharge and redness.   Respiratory: Negative.  Negative for cough and shortness of breath.    Cardiovascular: Negative.  Negative for chest pain, palpitations and leg swelling.   Gastrointestinal:  Positive for abdominal pain, nausea and vomiting. Negative for blood in stool and diarrhea.   Genitourinary: Negative.  Negative for dysuria, flank pain, frequency, hematuria, pelvic pain, vaginal bleeding and vaginal discharge.   Musculoskeletal:  Positive for back pain. Negative for arthralgias and neck pain.   Skin: Negative.  Negative for rash.   Neurological: Negative.  Negative for weakness, light-headedness, numbness and headaches.   Psychiatric/Behavioral: Negative.            Physical Exam   Physical Exam     ED Triage Vitals   ED Triage Vitals Group      Temp 01/27/25 0847 98.2 °F (36.8 °C)      Heart Rate 01/27/25 0844 91      Resp 01/27/25 0844 20      BP 01/27/25 0844 (!) 147/88      SpO2 01/27/25 0844 96 %      EtCO2 mmHg --       Height --       Weight --       Weight Scale Used --       BMI (Calculated) --       IBW/kg (Calculated) --        Physical Exam  Vitals and nursing note reviewed.   Constitutional:       General: She is in acute distress.      Appearance: She is well-developed. She is not toxic-appearing.      Comments: Appears to be in mild distress due to pain.    HENT:      Head: Normocephalic and atraumatic.      Mouth/Throat:      Mouth: Mucous membranes are moist.      Pharynx:  Oropharynx is clear. No oropharyngeal exudate.   Eyes:      Conjunctiva/sclera: Conjunctivae normal.      Pupils: Pupils are equal, round, and reactive to light.   Neck:      Trachea: No tracheal deviation.   Cardiovascular:      Rate and Rhythm: Normal rate and regular rhythm.      Pulses: Normal pulses.           Radial pulses are 2+ on the right side and 2+ on the left side.        Dorsalis pedis pulses are 2+ on the right side and 2+ on the left side.      Heart sounds: Normal heart sounds, S1 normal and S2 normal. No murmur heard.  Pulmonary:      Effort: Pulmonary effort is normal. No respiratory distress.      Breath sounds: Normal breath sounds. No stridor. No decreased breath sounds, wheezing or rales.   Abdominal:      General: There is no distension.      Palpations: Abdomen is soft. There is no mass.      Tenderness: There is abdominal tenderness in the right upper quadrant and epigastric area. There is no guarding or rebound.   Musculoskeletal:         General: No swelling or tenderness. Normal range of motion.      Cervical back: Normal range of motion and neck supple.   Lymphadenopathy:      Cervical: No cervical adenopathy.   Skin:     General: Skin is warm and dry.      Capillary Refill: Capillary refill takes less than 2 seconds.      Findings: No erythema or rash.   Neurological:      General: No focal deficit present.      Mental Status: She is alert and oriented to person, place, and time. Mental status is at baseline.      GCS: GCS eye subscore is 4. GCS verbal subscore is 5. GCS motor subscore is 6.      Cranial Nerves: No cranial nerve deficit.      Sensory: No sensory deficit.      Motor: No weakness.      Coordination: Coordination normal.   Psychiatric:         Mood and Affect: Mood normal.         Speech: Speech normal.         Behavior: Behavior normal.              Procedures   ED Procedures     Procedures     Lab Results   ED Lab     Results for orders placed or performed during the  hospital encounter of 01/27/25   Urinalysis & Reflex Microscopy With Culture If Indicated    Specimen: Urine clean catch   Result Value Ref Range    COLOR, URINALYSIS Colorless     APPEARANCE, URINALYSIS Clear     GLUCOSE, URINALYSIS Negative Negative mg/dL    BILIRUBIN, URINALYSIS Negative Negative    KETONES, URINALYSIS Negative Negative mg/dL    SPECIFIC GRAVITY, URINALYSIS 1.018 1.005 - 1.030    OCCULT BLOOD, URINALYSIS Negative Negative    PH, URINALYSIS 6.0 5.0 - 7.0    PROTEIN, URINALYSIS Negative Negative mg/dL    UROBILINOGEN, URINALYSIS 0.2 0.2, 1.0 mg/dL    NITRITE, URINALYSIS Negative Negative    LEUKOCYTE ESTERASE, URINALYSIS Small (A) Negative    SQUAMOUS EPITHELIAL, URINALYSIS 1 to 5 None Seen, 1 to 5 /hpf    ERYTHROCYTES, URINALYSIS 1 to 2 None Seen, 1 to 2 /hpf    LEUKOCYTES, URINALYSIS 1 to 5 None Seen, 1 to 5 /hpf    BACTERIA, URINALYSIS None Seen None Seen /hpf    HYALINE CASTS, URINALYSIS None Seen None Seen, 1 to 5 /lpf    MUCUS Present    Comprehensive Metabolic Panel    Specimen: Blood, Venous   Result Value Ref Range    Fasting Status      Sodium 136 135 - 145 mmol/L    Potassium 4.1 3.4 - 5.1 mmol/L    Chloride 106 97 - 110 mmol/L    Carbon Dioxide 27 21 - 32 mmol/L    Anion Gap 7 7 - 19 mmol/L    Glucose 91 70 - 99 mg/dL    BUN 13 6 - 20 mg/dL    Creatinine 0.70 0.51 - 0.95 mg/dL    Glomerular Filtration Rate >90 >=60    BUN/Cr 19 7 - 25    Calcium 9.5 8.4 - 10.2 mg/dL    Bilirubin, Total 0.3 0.2 - 1.0 mg/dL    GOT/AST 11 <=37 Units/L    GPT/ALT 18 <64 Units/L    Alkaline Phosphatase 68 45 - 117 Units/L    Albumin 3.8 3.4 - 5.0 g/dL    Protein, Total 7.9 6.4 - 8.2 g/dL    Globulin 4.1 (H) 2.0 - 4.0 g/dL    A/G Ratio 0.9 (L) 1.0 - 2.4   Lipase    Specimen: Blood, Venous   Result Value Ref Range    Lipase 23 15 - 77 Units/L   CBC with Automated Differential (performable only)    Specimen: Blood, Venous   Result Value Ref Range    WBC 8.3 4.2 - 11.0 K/mcL    RBC 4.27 4.00 - 5.20 mil/mcL     HGB 13.3 12.0 - 15.5 g/dL    HCT 39.6 36.0 - 46.5 %    MCV 92.7 78.0 - 100.0 fl    MCH 31.1 26.0 - 34.0 pg    MCHC 33.6 32.0 - 36.5 g/dL    RDW-CV 12.6 11.0 - 15.0 %    RDW-SD 42.9 39.0 - 50.0 fL     140 - 450 K/mcL    NRBC 0 <=0 /100 WBC    Neutrophil, Percent 71 %    Lymphocytes, Percent 18 %    Mono, Percent 6 %    Eosinophils, Percent 4 %    Basophils, Percent 1 %    Immature Granulocytes 0 %    Absolute Neutrophils 6.0 1.8 - 7.7 K/mcL    Absolute Lymphocytes 1.5 1.0 - 4.8 K/mcL    Absolute Monocytes 0.5 0.3 - 0.9 K/mcL    Absolute Eosinophils  0.3 0.0 - 0.5 K/mcL    Absolute Basophils 0.1 0.0 - 0.3 K/mcL    Absolute Immature Granulocytes 0.0 0.0 - 0.2 K/mcL   HCG POC    Specimen: Urine   Result Value Ref Range    HCG, URINE - POINT OF CARE Negative Negative   ISTAT8 VENOUS  POINT OF CARE    Specimen: Blood   Result Value Ref Range    BUN - POINT OF CARE 13 6 - 20 mg/dL    SODIUM - POINT OF CARE 139 135 - 145 mmol/L    POTASSIUM - POINT OF CARE 4.2 3.4 - 5.1 mmol/L    CHLORIDE - POINT OF CARE 102 97 - 110 mmol/L    TCO2 - POINT OF CARE 28 (H) 19 - 24 mmol/L    ANION GAP - POINT OF CARE 13 7 - 19 mmol/L    HEMATOCRIT - POINT OF CARE 40.0 36.0 - 46.5 %    HEMOGLOBIN - POINT OF CARE 13.6 12.0 - 15.5 g/dL    GLUCOSE - POINT OF CARE 86 70 - 99 mg/dL    CALCIUM, IONIZED - POINT OF CARE 1.22 1.15 - 1.29 mmol/L    CREATININE - POINT OF CARE 0.80 0.51 - 0.95 mg/dL    GLOMERULAR FILTRATION RATE - POINT OF CARE >90 >=60   TROPONIN I  POINT OF CARE    Specimen: Blood   Result Value Ref Range    TROPONIN I - POINT OF CARE <0.10 <0.10 ng/mL          EKG     [facility] EKG report   Results for orders placed or performed during the hospital encounter of 01/27/25   ECG   Result Value Ref Range    Systolic Blood Pressure 141     Diastolic Blood Pressure 89     Ventricular Rate EKG/Min (BPM) 72     Atrial Rate (BPM) 72     MS-Interval (MSEC) 148     QRS-Interval (MSEC) 86     QT-Interval (MSEC) 396     QTc 433     P Axis  (Degrees) 56     R Axis (Degrees) 59     T Axis (Degrees) 45     REPORT TEXT       Normal sinus rhythm  Normal ECG  When compared with ECG of  06-JUN-2024 12:27,  No significant change was found  Confirmed by CARLITO DELGADO JAMIE K. ([id number]),  Darrel Espinal ([id number]) on 1/27/2025 10:28:40 AM         Radiology Results   ED Radiology Results     Imaging Results              US LIVER GALLBLADDER PANCREAS (RUQ) (Final result)  Result time 01/27/25 10:26:50      Final result                   Impression:    IMPRESSION:  1. Cholelithiasis with suspicious findings for choledocholithiasis, as  above. The common bile duct is dilated to 11.1 mm. No gross ultrasound  evidence for cholecystitis, correlate clinically and with labs.  2. Hepatic steatosis, no mass lesion or contour irregularity. Correlate  with enzymes.  3. No right renal calculus or hydronephrosis.    Electronically Signed by: Terry Hoffmann MD  Signed on: 1/27/2025 10:26 AM  Created on Workstation ID: [id number]  Signed on Workstation ID: [id number]               Narrative:    EXAM: US LIVER GALLBLADDER PANCREAS (RUQ)    INDICATION: epigasrtic pain that radiates to her back x 4 days     DIAGNOSIS: Epigastric and abdominal pain    COMPARISON: None.    TECHNIQUE: Grayscale and color Doppler sonography of the right upper  quadrant was performed.    FINDINGS: The liver is normal in size and contour. The echotexture is  slightly increased. There are no focal hepatic lesions. There is no biliary  dilatation. The common bile duct measures 11.1 mm.    Multiple gallstones are seen in the dependent portion of the gallbladder.  No obvious pericholecystic fluid or sonographic Enriquez sign. In the dilated  common bile duct, there are questionable small reflective gallstones  (marked with arrows).    The visualized portion of the pancreas is grossly unremarkable. Most of the  body and tail of the pancreas is obscured by bowel gas.    The right kidney is normal in size  and cortical thickness. There is no  hydronephrosis or evidence of renal calculi.    There is no ascites. The visualized upper abdominal aorta and IVC are  unremarkable.                                         XR CHEST AP OR PA (Final result)  Result time 01/27/25 09:20:03      Final result                   Impression:    IMPRESSION: No acute cardiopulmonary disease. No interval change.      Electronically Signed by: Terry Hoffmann MD  Signed on: 1/27/2025 9:20 AM  Created on Workstation ID: [id number]  Signed on Workstation ID: [id number]               Narrative:      HISTORY: epigastric pain    EXAM: AP chest xray    COMPARISON: June 6, 2024    FINDINGS: The lungs are clear, with sharp costophrenic angles. The heart  size and pulmonary vasculature are normal. The mediastinal contour is  normal. There is no pneumothorax.                                         ED Medications   ED Medications     Medications   morphine injection 4 mg (4 mg Intravenous Given 1/27/25 0908)          ED Course     Vitals:    01/27/25 0924 01/27/25 0941 01/27/25 1041 01/27/25 1058   BP: (!) 141/89 122/70 128/78 124/74   BP Location:       Patient Position:       Pulse: 80 78 66 70   Resp: 19 15 15    Temp:       TempSrc:       SpO2: 98% 97% 99% 99%   LMP: 12/30/2024       ED Course as of 01/27/25 1126   Mon Jan 27, 2025   1106 I rechecked the pt who is resting. Updated her on the results of workup. I explained that her US shows evidence of cholelithiasis and questionable choledocholithiasis. Discussed options for treatment. Plan for admission and surgical consultation. Pt understands and agrees with the plan. All questions addressed.    [CL]   1115 I have discussed the case with Dr. Ritesh Wright, General Surgery.   We discussed the pertinent history, physical, diagnostic studies and the ED management of the patient.  The plan is for Dr. Wright to consult on the pt's admission.    [CL]   1119 Patient states that she's been NPO since 2330  last night.  I asked her to remain NPO.  Per her request, I have asked [facility] to send financial counseling in to see the patient.  [JM]   7994 I have discussed the case with Dr. Deja Mednes, the Hospitalist provider.   We discussed the pertinent history, physical, diagnostic studies and the ED management of the patient.  The plan is to admit the pt with surgery on consult.    [CL]      ED Course User Index  [CL] Darrel Espinal  [JM] Gilberto Orellana MD       Radiology Review: I have independently interpreted the RUQ US and have found gallstones.  I am awaiting on the final radiology read.          Consults                ED Consults done in the ED course    Medical Decision Making                                     MDM done in ED Course  Suspect choledocholithiasis given dilated CBD.  Plan for surgical consultation who will determine need of GI consultation.  NPO. Afebrile with normal WBC.  No signs of infection upon admission however the patient is at risk for developing cholecystitis if gallstones not addressed giving ongoing pain and dilated CBD.       Does the patient have sepsis: NO     Critical Care       No Critical Care        Disposition       Clinical Impression and Diagnosis  11:16 AM 01/27/25     Diagnosis:   1. Calculus of gallbladder and bile duct with obstruction without cholecystitis           Admit 1/27/2025 11:27 AM  Telemetry Bed?: No  Admitting Physician: DEJA MENDES [[id number]]  Is this a telephone or verbal order?: This is a telephone order from the admitting physician            I have reviewed the information recorded by the scribe for accuracy and agree with its contents.    ____________________________________________________________________    Darrel Espinal acting as a scribe for Dr. Gilberto Orellana  Dictation # [id number]  Scribe: Gilberto Conn MD  01/27/25 7554     15

## 2025-01-29 ENCOUNTER — APPOINTMENT (OUTPATIENT)
Dept: GASTROENTEROLOGY | Facility: CLINIC | Age: 25
End: 2025-01-29
Payer: MEDICAID

## 2025-01-29 ENCOUNTER — NON-APPOINTMENT (OUTPATIENT)
Age: 25
End: 2025-01-29

## 2025-01-29 VITALS
WEIGHT: 232 LBS | OXYGEN SATURATION: 99 % | DIASTOLIC BLOOD PRESSURE: 84 MMHG | HEIGHT: 71 IN | BODY MASS INDEX: 32.48 KG/M2 | HEART RATE: 96 BPM | SYSTOLIC BLOOD PRESSURE: 139 MMHG

## 2025-01-29 DIAGNOSIS — K58.2 MIXED IRRITABLE BOWEL SYNDROME: ICD-10-CM

## 2025-01-29 DIAGNOSIS — R10.84 GENERALIZED ABDOMINAL PAIN: ICD-10-CM

## 2025-01-29 DIAGNOSIS — K21.00 GASTRO-ESOPHAGEAL REFLUX DISEASE WITH ESOPHAGITIS, WITHOUT BLEEDING: ICD-10-CM

## 2025-01-29 DIAGNOSIS — Z71.9 COUNSELING, UNSPECIFIED: ICD-10-CM

## 2025-01-29 PROCEDURE — 99214 OFFICE O/P EST MOD 30 MIN: CPT

## 2025-01-29 PROCEDURE — G2211 COMPLEX E/M VISIT ADD ON: CPT | Mod: NC

## 2025-02-01 ENCOUNTER — OUTPATIENT (OUTPATIENT)
Dept: OUTPATIENT SERVICES | Facility: HOSPITAL | Age: 25
LOS: 1 days | End: 2025-02-01

## 2025-02-01 DIAGNOSIS — Z00.8 ENCOUNTER FOR OTHER GENERAL EXAMINATION: ICD-10-CM

## 2025-02-03 ENCOUNTER — APPOINTMENT (OUTPATIENT)
Dept: MRI IMAGING | Facility: CLINIC | Age: 25
End: 2025-02-03
Payer: MEDICAID

## 2025-02-03 ENCOUNTER — OUTPATIENT (OUTPATIENT)
Dept: OUTPATIENT SERVICES | Facility: HOSPITAL | Age: 25
LOS: 1 days | End: 2025-02-03
Payer: MEDICAID

## 2025-02-03 DIAGNOSIS — Z00.8 ENCOUNTER FOR OTHER GENERAL EXAMINATION: ICD-10-CM

## 2025-02-03 DIAGNOSIS — S93.492A SPRAIN OF OTHER LIGAMENT OF LEFT ANKLE, INITIAL ENCOUNTER: ICD-10-CM

## 2025-02-03 PROCEDURE — 73721 MRI JNT OF LWR EXTRE W/O DYE: CPT | Mod: 26,LT

## 2025-02-03 PROCEDURE — 73721 MRI JNT OF LWR EXTRE W/O DYE: CPT

## 2025-02-04 ENCOUNTER — APPOINTMENT (OUTPATIENT)
Dept: NEUROLOGY | Facility: CLINIC | Age: 25
End: 2025-02-04
Payer: MEDICAID

## 2025-02-04 VITALS
SYSTOLIC BLOOD PRESSURE: 130 MMHG | HEART RATE: 105 BPM | OXYGEN SATURATION: 97 % | WEIGHT: 232 LBS | BODY MASS INDEX: 32.48 KG/M2 | DIASTOLIC BLOOD PRESSURE: 64 MMHG | HEIGHT: 71 IN

## 2025-02-04 DIAGNOSIS — I67.1 CEREBRAL ANEURYSM, NONRUPTURED: ICD-10-CM

## 2025-02-04 PROCEDURE — 99214 OFFICE O/P EST MOD 30 MIN: CPT

## 2025-02-04 PROCEDURE — G2211 COMPLEX E/M VISIT ADD ON: CPT | Mod: NC

## 2025-02-10 ENCOUNTER — APPOINTMENT (OUTPATIENT)
Dept: ORTHOPEDIC SURGERY | Facility: CLINIC | Age: 25
End: 2025-02-10
Payer: MEDICAID

## 2025-02-10 VITALS
BODY MASS INDEX: 32.48 KG/M2 | WEIGHT: 232 LBS | HEART RATE: 91 BPM | HEIGHT: 71 IN | SYSTOLIC BLOOD PRESSURE: 123 MMHG | DIASTOLIC BLOOD PRESSURE: 73 MMHG

## 2025-02-10 DIAGNOSIS — S93.492A SPRAIN OF OTHER LIGAMENT OF LEFT ANKLE, INITIAL ENCOUNTER: ICD-10-CM

## 2025-02-10 PROCEDURE — 99213 OFFICE O/P EST LOW 20 MIN: CPT

## 2025-02-17 NOTE — ED PROVIDER NOTE - PRINCIPAL DIAGNOSIS
8495     Chest pain

## 2025-02-21 ENCOUNTER — OUTPATIENT (OUTPATIENT)
Dept: OUTPATIENT SERVICES | Facility: HOSPITAL | Age: 25
LOS: 1 days | End: 2025-02-21

## 2025-02-21 ENCOUNTER — APPOINTMENT (OUTPATIENT)
Dept: GASTROENTEROLOGY | Facility: GI CENTER | Age: 25
End: 2025-02-21

## 2025-02-21 DIAGNOSIS — K21.00 GASTRO-ESOPHAGEAL REFLUX DISEASE WITH ESOPHAGITIS, WITHOUT BLEEDING: ICD-10-CM

## 2025-02-21 PROCEDURE — 91013 ESOPHGL MOTIL W/STIM/PERFUS: CPT | Mod: 26

## 2025-02-21 PROCEDURE — 91010 ESOPHAGUS MOTILITY STUDY: CPT

## 2025-02-21 PROCEDURE — 91037 ESOPH IMPED FUNCTION TEST: CPT

## 2025-02-21 PROCEDURE — 91010 ESOPHAGUS MOTILITY STUDY: CPT | Mod: 26

## 2025-02-21 PROCEDURE — 91037 ESOPH IMPED FUNCTION TEST: CPT | Mod: 26

## 2025-03-20 ENCOUNTER — NON-APPOINTMENT (OUTPATIENT)
Age: 25
End: 2025-03-20

## 2025-04-30 ENCOUNTER — APPOINTMENT (OUTPATIENT)
Dept: GASTROENTEROLOGY | Facility: CLINIC | Age: 25
End: 2025-04-30
Payer: MEDICAID

## 2025-04-30 VITALS
TEMPERATURE: 98.1 F | OXYGEN SATURATION: 98 % | WEIGHT: 245 LBS | BODY MASS INDEX: 34.3 KG/M2 | DIASTOLIC BLOOD PRESSURE: 107 MMHG | HEIGHT: 71 IN | SYSTOLIC BLOOD PRESSURE: 126 MMHG | RESPIRATION RATE: 16 BRPM | HEART RATE: 116 BPM

## 2025-04-30 DIAGNOSIS — R10.84 GENERALIZED ABDOMINAL PAIN: ICD-10-CM

## 2025-04-30 DIAGNOSIS — R19.7 DIARRHEA, UNSPECIFIED: ICD-10-CM

## 2025-04-30 DIAGNOSIS — E86.0 DEHYDRATION: ICD-10-CM

## 2025-04-30 DIAGNOSIS — R79.89 OTHER SPECIFIED ABNORMAL FINDINGS OF BLOOD CHEMISTRY: ICD-10-CM

## 2025-04-30 PROCEDURE — G2211 COMPLEX E/M VISIT ADD ON: CPT | Mod: NC

## 2025-04-30 PROCEDURE — 99214 OFFICE O/P EST MOD 30 MIN: CPT

## 2025-05-04 PROBLEM — R79.89 ELEVATED LFTS: Status: ACTIVE | Noted: 2025-05-04

## 2025-05-04 PROBLEM — E86.0 DEHYDRATION, SEVERE: Status: ACTIVE | Noted: 2025-04-30

## 2025-05-04 LAB
ALBUMIN SERPL ELPH-MCNC: 4.6 G/DL
ALP BLD-CCNC: 140 U/L
ALT SERPL-CCNC: 178 U/L
ANION GAP SERPL CALC-SCNC: 15 MMOL/L
AST SERPL-CCNC: 69 U/L
BASOPHILS # BLD AUTO: 0.02 K/UL
BASOPHILS NFR BLD AUTO: 0.2 %
BILIRUB SERPL-MCNC: 0.2 MG/DL
BUN SERPL-MCNC: 9 MG/DL
CALCIUM SERPL-MCNC: 9.5 MG/DL
CHLORIDE SERPL-SCNC: 100 MMOL/L
CO2 SERPL-SCNC: 24 MMOL/L
CREAT SERPL-MCNC: 0.84 MG/DL
CRP SERPL-MCNC: 5 MG/L
EGFRCR SERPLBLD CKD-EPI 2021: 124 ML/MIN/1.73M2
EOSINOPHIL # BLD AUTO: 0.05 K/UL
EOSINOPHIL NFR BLD AUTO: 0.4 %
GLUCOSE SERPL-MCNC: 91 MG/DL
HCT VFR BLD CALC: 45 %
HGB BLD-MCNC: 15 G/DL
IGA SER QL IEP: 180 MG/DL
IMM GRANULOCYTES NFR BLD AUTO: 0.5 %
INR PPP: 0.99 RATIO
LYMPHOCYTES # BLD AUTO: 1.38 K/UL
LYMPHOCYTES NFR BLD AUTO: 11.9 %
MAGNESIUM SERPL-MCNC: 1.8 MG/DL
MAN DIFF?: NORMAL
MCHC RBC-ENTMCNC: 28.6 PG
MCHC RBC-ENTMCNC: 33.3 G/DL
MCV RBC AUTO: 85.9 FL
MONOCYTES # BLD AUTO: 0.88 K/UL
MONOCYTES NFR BLD AUTO: 7.6 %
NEUTROPHILS # BLD AUTO: 9.2 K/UL
NEUTROPHILS NFR BLD AUTO: 79.4 %
PLATELET # BLD AUTO: 352 K/UL
POTASSIUM SERPL-SCNC: 3.9 MMOL/L
PROT SERPL-MCNC: 7.6 G/DL
PT BLD: 11.8 SEC
RBC # BLD: 5.24 M/UL
RBC # FLD: 12.9 %
SODIUM SERPL-SCNC: 138 MMOL/L
TSH SERPL-ACNC: 1.89 UIU/ML
TTG IGA SER IA-ACNC: <0.5 U/ML
TTG IGA SER-ACNC: NEGATIVE
TTG IGG SER IA-ACNC: <0.8 U/ML
TTG IGG SER IA-ACNC: NEGATIVE
WBC # FLD AUTO: 11.59 K/UL

## 2025-05-05 ENCOUNTER — NON-APPOINTMENT (OUTPATIENT)
Age: 25
End: 2025-05-05

## 2025-05-09 LAB
BACTERIA STL CULT: NORMAL
CDIFF BY PCR: NOT DETECTED
DEPRECATED O AND P PREP STL: NORMAL

## 2025-05-20 DIAGNOSIS — R10.84 GENERALIZED ABDOMINAL PAIN: ICD-10-CM

## 2025-05-28 ENCOUNTER — OUTPATIENT (OUTPATIENT)
Dept: OUTPATIENT SERVICES | Facility: HOSPITAL | Age: 25
LOS: 1 days | End: 2025-05-28
Payer: MEDICAID

## 2025-05-28 ENCOUNTER — APPOINTMENT (OUTPATIENT)
Dept: CT IMAGING | Facility: CLINIC | Age: 25
End: 2025-05-28
Payer: MEDICAID

## 2025-05-28 DIAGNOSIS — R10.84 GENERALIZED ABDOMINAL PAIN: ICD-10-CM

## 2025-05-28 PROCEDURE — 74177 CT ABD & PELVIS W/CONTRAST: CPT | Mod: 26

## 2025-05-28 PROCEDURE — 74177 CT ABD & PELVIS W/CONTRAST: CPT

## 2025-06-02 DIAGNOSIS — K63.89 OTHER SPECIFIED DISEASES OF INTESTINE: ICD-10-CM

## 2025-06-02 RX ORDER — OMEPRAZOLE 20 MG/1
20 CAPSULE, DELAYED RELEASE ORAL TWICE DAILY
Qty: 14 | Refills: 0 | Status: ACTIVE | COMMUNITY
Start: 2025-06-02 | End: 1900-01-01

## 2025-06-02 RX ORDER — NAPROXEN 250 MG/1
250 TABLET ORAL
Qty: 14 | Refills: 0 | Status: ACTIVE | COMMUNITY
Start: 2025-06-02 | End: 1900-01-01

## 2025-06-09 ENCOUNTER — OFFICE (OUTPATIENT)
Dept: URBAN - METROPOLITAN AREA CLINIC 115 | Facility: CLINIC | Age: 25
Setting detail: OPHTHALMOLOGY
End: 2025-06-09
Payer: COMMERCIAL

## 2025-06-09 DIAGNOSIS — H04.123: ICD-10-CM

## 2025-06-09 DIAGNOSIS — H47.233: ICD-10-CM

## 2025-06-09 DIAGNOSIS — H43.393: ICD-10-CM

## 2025-06-09 PROCEDURE — 92014 COMPRE OPH EXAM EST PT 1/>: CPT | Performed by: OPHTHALMOLOGY

## 2025-06-09 PROCEDURE — 92133 CPTRZD OPH DX IMG PST SGM ON: CPT | Performed by: OPHTHALMOLOGY

## 2025-06-09 ASSESSMENT — KERATOMETRY
OD_K2POWER_DIOPTERS: 45.25
OS_K2POWER_DIOPTERS: 45.75
OS_K1POWER_DIOPTERS: 43.75
METHOD_AUTO_MANUAL: AUTO
OD_K1POWER_DIOPTERS: 43.75
OD_AXISANGLE_DEGREES: 094
OS_AXISANGLE_DEGREES: 089

## 2025-06-09 ASSESSMENT — PACHYMETRY
OS_CT_CORRECTION: -3
OD_CT_CORRECTION: -4
OD_CT_UM: 600
OS_CT_UM: 580

## 2025-06-09 ASSESSMENT — REFRACTION_MANIFEST
OS_SPHERE: -4.75
OS_VA1: 20/20
OD_AXIS: 180
OS_AXIS: 180
OD_CYLINDER: -1.25
OD_SPHERE: -5.00
OD_VA1: 20/20
OS_CYLINDER: -1.25

## 2025-06-09 ASSESSMENT — REFRACTION_CURRENTRX
OD_SPHERE: -4.75
OD_CYLINDER: -1.25
OS_VPRISM_DIRECTION: SV
OS_CYLINDER: -1.00
OD_VPRISM_DIRECTION: SV
OS_SPHERE: -4.50
OD_AXIS: 179
OS_AXIS: 001
OS_OVR_VA: 20/
OD_OVR_VA: 20/
OD_AXIS: 007
OS_VPRISM_DIRECTION: SV
OS_CYLINDER: -1.25
OD_CYLINDER: -0.50
OS_AXIS: 006
OD_OVR_VA: 20/
OS_SPHERE: -4.50
OS_OVR_VA: 20/
OD_SPHERE: -5.00
OD_VPRISM_DIRECTION: SV

## 2025-06-09 ASSESSMENT — REFRACTION_AUTOREFRACTION
OD_AXIS: 178
OD_SPHERE: -5.00
OD_CYLINDER: -1.75
OS_AXIS: 179
OS_SPHERE: -4.50
OS_CYLINDER: -1.25

## 2025-06-09 ASSESSMENT — CONFRONTATIONAL VISUAL FIELD TEST (CVF)
OD_FINDINGS: FULL
OS_FINDINGS: FULL

## 2025-06-09 ASSESSMENT — TEAR BREAK UP TIME (TBUT)
OS_TBUT: 1+
OD_TBUT: 1+

## 2025-06-09 ASSESSMENT — VISUAL ACUITY
OS_BCVA: 20/20-
OD_BCVA: 20/20

## 2025-06-10 ENCOUNTER — APPOINTMENT (OUTPATIENT)
Dept: GASTROENTEROLOGY | Facility: CLINIC | Age: 25
End: 2025-06-10

## 2025-06-16 ENCOUNTER — OFFICE (OUTPATIENT)
Dept: URBAN - METROPOLITAN AREA CLINIC 115 | Facility: CLINIC | Age: 25
Setting detail: OPHTHALMOLOGY
End: 2025-06-16
Payer: COMMERCIAL

## 2025-06-16 DIAGNOSIS — H35.411: ICD-10-CM

## 2025-06-16 PROCEDURE — 92012 INTRM OPH EXAM EST PATIENT: CPT | Performed by: OPHTHALMOLOGY

## 2025-06-16 PROCEDURE — 92134 CPTRZ OPH DX IMG PST SGM RTA: CPT | Performed by: OPHTHALMOLOGY

## 2025-06-16 ASSESSMENT — REFRACTION_CURRENTRX
OD_SPHERE: -4.75
OD_AXIS: 007
OD_OVR_VA: 20/
OS_VPRISM_DIRECTION: SV
OS_SPHERE: -4.50
OD_SPHERE: -5.00
OS_OVR_VA: 20/
OD_VPRISM_DIRECTION: SV
OS_AXIS: 006
OD_AXIS: 179
OD_CYLINDER: -0.50
OS_CYLINDER: -1.25
OS_CYLINDER: -1.00
OS_AXIS: 001
OD_CYLINDER: -1.25
OD_OVR_VA: 20/
OS_OVR_VA: 20/
OS_VPRISM_DIRECTION: SV
OS_SPHERE: -4.50
OD_VPRISM_DIRECTION: SV

## 2025-06-16 ASSESSMENT — PACHYMETRY
OD_CT_UM: 600
OS_CT_CORRECTION: -3
OS_CT_UM: 580
OD_CT_CORRECTION: -4

## 2025-06-16 ASSESSMENT — REFRACTION_AUTOREFRACTION
OD_CYLINDER: -1.75
OS_SPHERE: -4.50
OD_AXIS: 178
OS_CYLINDER: -1.25
OD_SPHERE: -5.00
OS_AXIS: 179

## 2025-06-16 ASSESSMENT — TEAR BREAK UP TIME (TBUT)
OD_TBUT: 1+
OS_TBUT: 1+

## 2025-06-16 ASSESSMENT — KERATOMETRY
OS_K1POWER_DIOPTERS: 43.75
OD_AXISANGLE_DEGREES: 094
OD_K1POWER_DIOPTERS: 43.75
OS_K2POWER_DIOPTERS: 45.75
METHOD_AUTO_MANUAL: AUTO
OS_AXISANGLE_DEGREES: 089
OD_K2POWER_DIOPTERS: 45.25

## 2025-06-16 ASSESSMENT — CONFRONTATIONAL VISUAL FIELD TEST (CVF)
OS_FINDINGS: FULL
OD_FINDINGS: FULL

## 2025-06-16 ASSESSMENT — VISUAL ACUITY
OS_BCVA: 20/20-1
OD_BCVA: 20/20

## 2025-06-16 ASSESSMENT — TONOMETRY
OD_IOP_MMHG: 17
OS_IOP_MMHG: 16

## 2025-06-23 ENCOUNTER — APPOINTMENT (OUTPATIENT)
Dept: GASTROENTEROLOGY | Facility: CLINIC | Age: 25
End: 2025-06-23
Payer: MEDICAID

## 2025-06-23 VITALS
RESPIRATION RATE: 14 BRPM | HEART RATE: 98 BPM | WEIGHT: 240 LBS | OXYGEN SATURATION: 98 % | BODY MASS INDEX: 33.6 KG/M2 | SYSTOLIC BLOOD PRESSURE: 137 MMHG | HEIGHT: 71 IN | DIASTOLIC BLOOD PRESSURE: 84 MMHG

## 2025-06-23 PROBLEM — Z09 FOLLOW-UP EXAM: Status: ACTIVE | Noted: 2025-06-23

## 2025-06-23 PROCEDURE — 99213 OFFICE O/P EST LOW 20 MIN: CPT

## 2025-06-23 RX ORDER — POLYETHYLENE GLYCOL 3350, SODIUM SULFATE, POTASSIUM CHLORIDE, MAGNESIUM SULFATE, AND SODIUM CHLORIDE FOR ORAL SOLUTION 178.7-7.3G
178.7 KIT ORAL
Qty: 1 | Refills: 0 | Status: ACTIVE | COMMUNITY
Start: 2025-06-23 | End: 1900-01-01

## 2025-06-23 RX ORDER — DICYCLOMINE HYDROCHLORIDE 10 MG/1
10 CAPSULE ORAL 3 TIMES DAILY
Qty: 90 | Refills: 0 | Status: ACTIVE | COMMUNITY
Start: 2025-06-23 | End: 1900-01-01

## 2025-08-18 ENCOUNTER — OFFICE (OUTPATIENT)
Dept: URBAN - METROPOLITAN AREA CLINIC 115 | Facility: CLINIC | Age: 25
Setting detail: OPHTHALMOLOGY
End: 2025-08-18
Payer: COMMERCIAL

## 2025-08-18 DIAGNOSIS — H43.393: ICD-10-CM

## 2025-08-18 DIAGNOSIS — I67.1: ICD-10-CM

## 2025-08-18 DIAGNOSIS — H35.411: ICD-10-CM

## 2025-08-18 PROCEDURE — 92012 INTRM OPH EXAM EST PATIENT: CPT | Performed by: OPHTHALMOLOGY

## 2025-08-18 PROCEDURE — 92201 OPSCPY EXTND RTA DRAW UNI/BI: CPT | Performed by: OPHTHALMOLOGY

## 2025-08-18 PROCEDURE — 92134 CPTRZ OPH DX IMG PST SGM RTA: CPT | Performed by: OPHTHALMOLOGY

## 2025-08-18 ASSESSMENT — KERATOMETRY
OD_AXISANGLE_DEGREES: 094
OD_K2POWER_DIOPTERS: 45.25
OS_K1POWER_DIOPTERS: 43.75
METHOD_AUTO_MANUAL: AUTO
OD_K1POWER_DIOPTERS: 43.75
OS_AXISANGLE_DEGREES: 089
OS_K2POWER_DIOPTERS: 45.75

## 2025-08-18 ASSESSMENT — VISUAL ACUITY
OS_BCVA: 20/20
OD_BCVA: 20/20-1

## 2025-08-18 ASSESSMENT — REFRACTION_CURRENTRX
OS_OVR_VA: 20/
OS_CYLINDER: -1.25
OS_SPHERE: -4.50
OD_OVR_VA: 20/
OD_VPRISM_DIRECTION: SV
OS_AXIS: 006
OD_SPHERE: -4.75
OD_OVR_VA: 20/
OD_SPHERE: -5.00
OS_VPRISM_DIRECTION: SV
OS_OVR_VA: 20/
OS_CYLINDER: -1.00
OD_AXIS: 179
OS_SPHERE: -4.50
OS_VPRISM_DIRECTION: SV
OS_AXIS: 001
OD_CYLINDER: -1.25
OD_CYLINDER: -0.50
OD_VPRISM_DIRECTION: SV
OD_AXIS: 007

## 2025-08-18 ASSESSMENT — TEAR BREAK UP TIME (TBUT)
OS_TBUT: 1+
OD_TBUT: 1+

## 2025-08-18 ASSESSMENT — REFRACTION_AUTOREFRACTION
OS_CYLINDER: -1.50
OD_CYLINDER: -1.75
OD_AXIS: 002
OD_SPHERE: -5.00
OS_AXIS: 001
OS_SPHERE: -4.50

## 2025-08-18 ASSESSMENT — TONOMETRY
OS_IOP_MMHG: 20
OD_IOP_MMHG: 21

## 2025-08-18 ASSESSMENT — PACHYMETRY
OS_CT_CORRECTION: -3
OD_CT_CORRECTION: -4
OD_CT_UM: 600
OS_CT_UM: 580

## 2025-08-18 ASSESSMENT — CONFRONTATIONAL VISUAL FIELD TEST (CVF)
OS_FINDINGS: FULL
OD_FINDINGS: FULL

## 2025-08-22 ENCOUNTER — TRANSCRIPTION ENCOUNTER (OUTPATIENT)
Age: 25
End: 2025-08-22

## 2025-08-24 ENCOUNTER — EMERGENCY (EMERGENCY)
Facility: HOSPITAL | Age: 25
LOS: 1 days | End: 2025-08-24
Attending: STUDENT IN AN ORGANIZED HEALTH CARE EDUCATION/TRAINING PROGRAM
Payer: COMMERCIAL

## 2025-08-24 VITALS
DIASTOLIC BLOOD PRESSURE: 68 MMHG | OXYGEN SATURATION: 97 % | RESPIRATION RATE: 18 BRPM | TEMPERATURE: 98 F | HEART RATE: 94 BPM | SYSTOLIC BLOOD PRESSURE: 105 MMHG

## 2025-08-24 VITALS
SYSTOLIC BLOOD PRESSURE: 132 MMHG | RESPIRATION RATE: 17 BRPM | TEMPERATURE: 98 F | DIASTOLIC BLOOD PRESSURE: 85 MMHG | HEART RATE: 115 BPM | OXYGEN SATURATION: 97 %

## 2025-08-24 PROCEDURE — 72131 CT LUMBAR SPINE W/O DYE: CPT

## 2025-08-24 PROCEDURE — 72128 CT CHEST SPINE W/O DYE: CPT

## 2025-08-24 PROCEDURE — 99284 EMERGENCY DEPT VISIT MOD MDM: CPT

## 2025-08-24 RX ORDER — ACETAMINOPHEN 500 MG/5ML
975 LIQUID (ML) ORAL ONCE
Refills: 0 | Status: COMPLETED | OUTPATIENT
Start: 2025-08-24 | End: 2025-08-24

## 2025-08-24 RX ORDER — LIDOCAINE HYDROCHLORIDE 20 MG/ML
1 JELLY TOPICAL ONCE
Refills: 0 | Status: COMPLETED | OUTPATIENT
Start: 2025-08-24 | End: 2025-08-24

## 2025-08-24 RX ADMIN — Medication 975 MILLIGRAM(S): at 19:37

## 2025-08-24 RX ADMIN — LIDOCAINE HYDROCHLORIDE 1 PATCH: 20 JELLY TOPICAL at 19:37

## 2025-08-25 ENCOUNTER — APPOINTMENT (OUTPATIENT)
Dept: CARDIOLOGY | Facility: CLINIC | Age: 25
End: 2025-08-25
Payer: MEDICAID

## 2025-08-25 ENCOUNTER — NON-APPOINTMENT (OUTPATIENT)
Age: 25
End: 2025-08-25

## 2025-08-25 VITALS
WEIGHT: 242 LBS | SYSTOLIC BLOOD PRESSURE: 118 MMHG | DIASTOLIC BLOOD PRESSURE: 72 MMHG | OXYGEN SATURATION: 98 % | BODY MASS INDEX: 33.88 KG/M2 | HEART RATE: 96 BPM | HEIGHT: 71 IN

## 2025-08-25 DIAGNOSIS — E66.811 OBESITY, CLASS 1: ICD-10-CM

## 2025-08-25 DIAGNOSIS — R94.31 ABNORMAL ELECTROCARDIOGRAM [ECG] [EKG]: ICD-10-CM

## 2025-08-25 DIAGNOSIS — I31.9 DISEASE OF PERICARDIUM, UNSPECIFIED: ICD-10-CM

## 2025-08-25 PROCEDURE — 72131 CT LUMBAR SPINE W/O DYE: CPT

## 2025-08-25 PROCEDURE — 72128 CT CHEST SPINE W/O DYE: CPT | Mod: 26

## 2025-08-25 PROCEDURE — 72131 CT LUMBAR SPINE W/O DYE: CPT | Mod: 26

## 2025-08-25 PROCEDURE — 99214 OFFICE O/P EST MOD 30 MIN: CPT | Mod: 25

## 2025-08-25 PROCEDURE — 72128 CT CHEST SPINE W/O DYE: CPT

## 2025-08-25 PROCEDURE — 99284 EMERGENCY DEPT VISIT MOD MDM: CPT | Mod: 25

## 2025-08-25 PROCEDURE — 93000 ELECTROCARDIOGRAM COMPLETE: CPT

## 2025-08-25 RX ORDER — LIDOCAINE HYDROCHLORIDE 20 MG/ML
1 JELLY TOPICAL
Qty: 1 | Refills: 0
Start: 2025-08-25

## 2025-08-26 ENCOUNTER — APPOINTMENT (OUTPATIENT)
Dept: GASTROENTEROLOGY | Facility: GI CENTER | Age: 25
End: 2025-08-26

## 2025-09-08 ENCOUNTER — TRANSCRIPTION ENCOUNTER (OUTPATIENT)
Age: 25
End: 2025-09-08

## 2025-09-08 ENCOUNTER — RESULT REVIEW (OUTPATIENT)
Age: 25
End: 2025-09-08

## 2025-09-08 ENCOUNTER — APPOINTMENT (OUTPATIENT)
Dept: GASTROENTEROLOGY | Facility: GI CENTER | Age: 25
End: 2025-09-08
Payer: MEDICAID

## 2025-09-08 DIAGNOSIS — K21.00 GASTRO-ESOPHAGEAL REFLUX DISEASE WITH ESOPHAGITIS, WITHOUT BLEEDING: ICD-10-CM

## 2025-09-08 DIAGNOSIS — R10.84 GENERALIZED ABDOMINAL PAIN: ICD-10-CM

## 2025-09-08 DIAGNOSIS — K58.2 MIXED IRRITABLE BOWEL SYNDROME: ICD-10-CM

## 2025-09-08 PROCEDURE — 43239 EGD BIOPSY SINGLE/MULTIPLE: CPT | Mod: 59

## 2025-09-08 PROCEDURE — 45378 DIAGNOSTIC COLONOSCOPY: CPT

## 2025-09-08 RX ORDER — VONOPRAZAN FUMARATE 26.72 MG/1
20 TABLET ORAL DAILY
Qty: 30 | Refills: 5 | Status: ACTIVE | COMMUNITY
Start: 2025-09-08 | End: 1900-01-01

## 2025-09-12 ENCOUNTER — NON-APPOINTMENT (OUTPATIENT)
Age: 25
End: 2025-09-12

## 2025-09-15 ENCOUNTER — APPOINTMENT (OUTPATIENT)
Age: 25
End: 2025-09-15
Payer: MEDICAID

## 2025-09-15 VITALS
SYSTOLIC BLOOD PRESSURE: 117 MMHG | DIASTOLIC BLOOD PRESSURE: 76 MMHG | HEART RATE: 109 BPM | OXYGEN SATURATION: 97 % | WEIGHT: 240 LBS | TEMPERATURE: 98.4 F | HEIGHT: 71 IN | BODY MASS INDEX: 33.6 KG/M2

## 2025-09-15 DIAGNOSIS — M54.14 RADICULOPATHY, THORACIC REGION: ICD-10-CM

## 2025-09-15 PROCEDURE — 99203 OFFICE O/P NEW LOW 30 MIN: CPT

## 2025-09-24 PROBLEM — K22.9 NODULE OF ESOPHAGUS: Status: ACTIVE | Noted: 2025-09-24

## (undated) DEVICE — CATH IV SAFE BC 22G X 1" (BLUE)

## (undated) DEVICE — UNDERPAD LINEN SAVER 23 X 36"

## (undated) DEVICE — DRSG CURITY GAUZE SPONGE 4 X 4" 12-PLY NON-STERILE

## (undated) DEVICE — PACK IV START WITH CHG

## (undated) DEVICE — DENTURE CUP PINK

## (undated) DEVICE — VENODYNE/SCD SLEEVE CALF MEDIUM

## (undated) DEVICE — SYR IV FLUSH SALINE 10ML 30/TY

## (undated) DEVICE — FORCEP BIOPSY ENDOSCOPIC 2.8MM DISP

## (undated) DEVICE — FORCEP RADIAL JAW 4 W NDL 2.4MM 2.8MM 240CM ORANGE DISP

## (undated) DEVICE — GOWN IMPERV XL

## (undated) DEVICE — TUBING ALARIS PUMP MODULE NON-DEHP

## (undated) DEVICE — SOL IRR BAG H2O 1000ML

## (undated) DEVICE — MASK PROCEED EARLOOP LVL 2 50/BX

## (undated) DEVICE — DRSG 2X2

## (undated) DEVICE — TUBING IV EXTENSION MACRO W CLAVE 7"

## (undated) DEVICE — SOL BAG NS 0.9% 1000ML

## (undated) DEVICE — SYR SLIP 10CC

## (undated) DEVICE — SOL IRR BAG NS 0.9% 1000ML

## (undated) DEVICE — SYR LUER SLIP TIP 50CC

## (undated) DEVICE — WARMING BLANKET FULL ADULT

## (undated) DEVICE — SENSOR O2 FINGER ADULT

## (undated) DEVICE — BITE BLOCK ADULT 20 X 27MM (GREEN)